# Patient Record
Sex: FEMALE | Race: WHITE | URBAN - METROPOLITAN AREA
[De-identification: names, ages, dates, MRNs, and addresses within clinical notes are randomized per-mention and may not be internally consistent; named-entity substitution may affect disease eponyms.]

---

## 2017-04-03 ENCOUNTER — TRANSFERRED RECORDS (OUTPATIENT)
Dept: HEALTH INFORMATION MANAGEMENT | Facility: CLINIC | Age: 77
End: 2017-04-03

## 2017-10-10 ENCOUNTER — TRANSFERRED RECORDS (OUTPATIENT)
Dept: HEALTH INFORMATION MANAGEMENT | Facility: CLINIC | Age: 77
End: 2017-10-10

## 2017-10-17 ENCOUNTER — TRANSFERRED RECORDS (OUTPATIENT)
Dept: HEALTH INFORMATION MANAGEMENT | Facility: CLINIC | Age: 77
End: 2017-10-17

## 2017-10-31 PROCEDURE — 00000346 ZZHCL STATISTIC REVIEW OUTSIDE SLIDES TC 88321: Performed by: OBSTETRICS & GYNECOLOGY

## 2017-11-01 LAB — COPATH REPORT: NORMAL

## 2017-11-13 ENCOUNTER — ONCOLOGY VISIT (OUTPATIENT)
Dept: ONCOLOGY | Facility: CLINIC | Age: 77
End: 2017-11-13
Attending: OBSTETRICS & GYNECOLOGY
Payer: MEDICARE

## 2017-11-13 DIAGNOSIS — Z01.818 PRE-OP EXAM: ICD-10-CM

## 2017-11-13 DIAGNOSIS — N88.9 CERVICAL LESION: ICD-10-CM

## 2017-11-13 DIAGNOSIS — R91.8 PULMONARY NODULES: ICD-10-CM

## 2017-11-13 DIAGNOSIS — C54.1 ENDOMETRIAL CANCER (H): ICD-10-CM

## 2017-11-13 DIAGNOSIS — Z01.818 PRE-OP EXAM: Primary | ICD-10-CM

## 2017-11-13 LAB
ALBUMIN SERPL-MCNC: 3.7 G/DL (ref 3.4–5)
ALP SERPL-CCNC: 52 U/L (ref 40–150)
ALT SERPL W P-5'-P-CCNC: 23 U/L (ref 0–50)
ANION GAP SERPL CALCULATED.3IONS-SCNC: 7 MMOL/L (ref 3–14)
AST SERPL W P-5'-P-CCNC: 16 U/L (ref 0–45)
BASOPHILS # BLD AUTO: 0.1 10E9/L (ref 0–0.2)
BASOPHILS NFR BLD AUTO: 0.8 %
BILIRUB SERPL-MCNC: 1.4 MG/DL (ref 0.2–1.3)
BUN SERPL-MCNC: 21 MG/DL (ref 7–30)
CALCIUM SERPL-MCNC: 9.3 MG/DL (ref 8.5–10.1)
CHLORIDE SERPL-SCNC: 106 MMOL/L (ref 94–109)
CO2 SERPL-SCNC: 27 MMOL/L (ref 20–32)
CREAT SERPL-MCNC: 0.96 MG/DL (ref 0.52–1.04)
DIFFERENTIAL METHOD BLD: NORMAL
EOSINOPHIL # BLD AUTO: 0.2 10E9/L (ref 0–0.7)
EOSINOPHIL NFR BLD AUTO: 3 %
ERYTHROCYTE [DISTWIDTH] IN BLOOD BY AUTOMATED COUNT: 13.1 % (ref 10–15)
GFR SERPL CREATININE-BSD FRML MDRD: 56 ML/MIN/1.7M2
GLUCOSE SERPL-MCNC: 108 MG/DL (ref 70–99)
HCT VFR BLD AUTO: 45.3 % (ref 35–47)
HGB BLD-MCNC: 14.7 G/DL (ref 11.7–15.7)
IMM GRANULOCYTES # BLD: 0 10E9/L (ref 0–0.4)
IMM GRANULOCYTES NFR BLD: 0.2 %
LYMPHOCYTES # BLD AUTO: 2.5 10E9/L (ref 0.8–5.3)
LYMPHOCYTES NFR BLD AUTO: 38.3 %
MCH RBC QN AUTO: 29.3 PG (ref 26.5–33)
MCHC RBC AUTO-ENTMCNC: 32.5 G/DL (ref 31.5–36.5)
MCV RBC AUTO: 90 FL (ref 78–100)
MONOCYTES # BLD AUTO: 0.5 10E9/L (ref 0–1.3)
MONOCYTES NFR BLD AUTO: 7.4 %
NEUTROPHILS # BLD AUTO: 3.3 10E9/L (ref 1.6–8.3)
NEUTROPHILS NFR BLD AUTO: 50.3 %
NRBC # BLD AUTO: 0 10*3/UL
NRBC BLD AUTO-RTO: 0 /100
PLATELET # BLD AUTO: 178 10E9/L (ref 150–450)
POTASSIUM SERPL-SCNC: 3.4 MMOL/L (ref 3.4–5.3)
PROT SERPL-MCNC: 7.8 G/DL (ref 6.8–8.8)
RBC # BLD AUTO: 5.02 10E12/L (ref 3.8–5.2)
SODIUM SERPL-SCNC: 140 MMOL/L (ref 133–144)
WBC # BLD AUTO: 6.6 10E9/L (ref 4–11)

## 2017-11-13 PROCEDURE — 36415 COLL VENOUS BLD VENIPUNCTURE: CPT | Performed by: OBSTETRICS & GYNECOLOGY

## 2017-11-13 PROCEDURE — 88305 TISSUE EXAM BY PATHOLOGIST: CPT | Performed by: OBSTETRICS & GYNECOLOGY

## 2017-11-13 PROCEDURE — 99205 OFFICE O/P NEW HI 60 MIN: CPT | Mod: ZP | Performed by: OBSTETRICS & GYNECOLOGY

## 2017-11-13 PROCEDURE — 57500 BIOPSY OF CERVIX: CPT | Mod: ZF | Performed by: OBSTETRICS & GYNECOLOGY

## 2017-11-13 PROCEDURE — 93010 ELECTROCARDIOGRAM REPORT: CPT | Performed by: INTERNAL MEDICINE

## 2017-11-13 PROCEDURE — 80053 COMPREHEN METABOLIC PANEL: CPT | Performed by: OBSTETRICS & GYNECOLOGY

## 2017-11-13 PROCEDURE — 85025 COMPLETE CBC W/AUTO DIFF WBC: CPT | Performed by: OBSTETRICS & GYNECOLOGY

## 2017-11-13 RX ORDER — PHENAZOPYRIDINE HYDROCHLORIDE 200 MG/1
200 TABLET, FILM COATED ORAL ONCE
Status: CANCELLED | OUTPATIENT
Start: 2017-11-13 | End: 2017-11-13

## 2017-11-13 NOTE — NURSING NOTE
Pre Op Nurse Teaching Template    Relevant Diagnosis: endometrial cancer    Teaching Topic: laparoscopic removal of uterus tubes ovaries cervix possible open abdomen, cancer staging, lymph node dissection    Person(s) involved in teaching :  Patient  & daughter  Motivation Level:  Asks Questions:    Yes      Eager to Learn:     Yes     Cooperative:          Yes    Receptive (willing. Able to accept information):    Yes      Patient and those who are listed above demonstrates understanding of the following:   Reason for the appointment, diagnosis and treatment plan:   Yes   Knowledge of proper use of medications and conditions for which they are ordered (with special attention to potential side effects or drug interactions): Yes   Which situations necessitate calling provider and whom to contact: Yes         Nutritional needs and diet plan:  Yes      Pain management techniques:     Yes, Pain Scale   Diet:   Yes, [facility] Diet Instructions    Teaching Concerns addressed: Yes    Infection Prevention:  Patient and those who are listed above demonstrate understanding of the following:  Pre-Op CHG Bathing Instructions: Yes  Surgical procedure site care taught:   Yes   Signs and symptoms of infection taught: Yes       Instructional Materials Used/Given:  [facility] [place] Before You Surgery Booklet  Showering or Bathing before Surgery Instructions & CHG Product  Hysterectomy Guidelines  Pain Assessment Tool   Home Care after Major Abdominal or Vaginal Surgery  Map  Accommodations Brochure  Phone numbers for [facility] and [facility]  Copy of Surgical Consent    Done Today:  Lab work, EKG, Chest Xray,   Preop Visit  not needed   Tests Ordered:  Post Op Visit Scheduled:12/18  Comments:    Surgery date/time:12/1    Consent sent to file in medical records  .

## 2017-11-13 NOTE — MR AVS SNAPSHOT
"              After Visit Summary   11/13/2017    Shani Tinoco    MRN: [id number]           Patient Information     Date Of Birth          1940        Visit Information        Provider Department      11/13/2017 12:00 PM Kt Degroot MD [facility]        Today's Diagnoses     Pre-op exam    -  1    Endometrial cancer (H)        Cervical lesion           Follow-ups after your visit        Your next 10 appointments already scheduled     Dec 18, 2017  3:00 PM CST   (Arrive by 2:45 PM)   Return Visit with Kt Degroot MD   [facility] ([facility])    [address] [place] [id number] [phone]              Who to contact     If you have questions or need follow up information about today's clinic visit or your schedule please contact [facility] directly at [phone].  Normal or non-critical lab and imaging results will be communicated to you by MyChart, letter or phone within 4 business days after the clinic has received the results. If you do not hear from us within 7 days, please contact the clinic through MyChart or phone. If you have a critical or abnormal lab result, we will notify you by phone as soon as possible.  Submit refill requests through [employer] or call your pharmacy and they will forward the refill request to us. Please allow 3 business days for your refill to be completed.          Additional Information About Your Visit        MyChart Information     [employer] lets you send messages to your doctor, view your test results, renew your prescriptions, schedule appointments and more. To sign up, go to [email or web address] . Click on \"Log in\" on the left side of the screen, which will take you to the Welcome page. Then click on \"Sign up Now\" on the right side of the page.     You will be asked to enter the access code listed below, as well as some personal " information. Please follow the directions to create your username and password.     Your access code is: [email or web address]  Expires: 2018  7:45 PM     Your access code will  in 90 days. If you need help or a new code, please call your [facility] clinic or [phone].        Care EveryWhere ID     This is your Care EveryWhere ID. This could be used by other organizations to access your [facility] medical records  [id number]         Blood Pressure from Last 3 Encounters:   No data found for BP    Weight from Last 3 Encounters:   No data found for Wt              We Performed the Following     Biopsy of Cervix     EKG 12-Lead Complete w/read - Clinics     Kat-Operative Worksheet - Laparoscopic Total Hysterectomy, bilateral, Salpingo-Oophorectomy, possible open, possible lymph node dissection     Surgical pathology exam        Primary Care Provider Office Phone # Fax #    Daniella Rodrigues [phone]       [place] [address]        Equal Access to Services     DAMI LOVELL AH: Hadii aad ku hadasho Soomaali, waaxda luqadaha, qaybta kaalmada adeegyada, waxay idiin hayaan narcisa pugh . So [facility] [phone].    ATENCIÓN: Si habla español, tiene a brumfield disposición servicios gratuitos de asistencia lingüística. [facility] [phone].    We comply with applicable federal civil rights laws and [place] laws. We do not discriminate on the basis of race, color, national origin, age, disability, sex, sexual orientation, or gender identity.            Thank you!     Thank you for choosing [facility]  for your care. Our goal is always to provide you with excellent care. Hearing back from our patients is one way we can continue to improve our services. Please take a few minutes to complete the written survey that you may receive in the mail after your visit with us. Thank you!             Your Updated Medication List - Protect others around you:  Learn how to safely use, store and throw away your medicines at www.disposemymeds.org.      Notice  As of 11/13/2017  5:17 PM    You have not been prescribed any medications.

## 2017-11-13 NOTE — PROGRESS NOTES
Consult Notes on Referred Patient    Date: 2017       Dr. Richardson [facility]  [address] [place] [id number]       RE: Shani Campa Earnest  : 1940  ANYA: 2017    Dear Dr. Richardson [facility]:    I had the pleasure of seeing your patient Shani Tinoco here at the Gynecologic Cancer Clinic at the [facility] on 2017.  As you know, she is a very pleasant 77 year old woman with a recent diagnosis of endometrioid adenocarcinoma of the uterus.  Given these findings, she was subsequently sent to the Gynecologic Cancer Clinic for a new patient consultation.     Today, she reports that she is feeling well. She notes improvement in her vaginal bleeding since starting on oral progesterone as prescribed by her PCP. She denies any other symptoms, including fevers/chills, abnormal weight gain or weight los, nausea/vomiting, chest pain, shortness of breath, dysuria, change in bowel habit, or other systemic symptoms.    Cancer History:  2014: First presentation for postmenopausal bleeding.  - Pelvic US Impression: Enlarged uterus with thickened endometrium, EMS 5.57 cm. No evidence of focal mass or the like within the endometrial cavity. Right ovary not visualized. Left without abnormality.  - EMBx notable for simple hyperplasia.   - Underwent progestin therapy x 3 months. Repeat EMBx without hyperplasia.    10/10/17: Presentation to PCP for recurrent episode of postmenopausal bleeding. Subsequent endometrial biopsy, with results of:  - Cervical biopsy: Squamous and columnar mucosa with mild chronic inflammation. No squamous intraepithelial lesion identified  - Endometrial biopsy: Endometrioid adenocarcinoma, FIGO Grade 2.    10/31/17: [facility] Pathology Consult. CASE FROM [place], [place] ([id number], OBTAINED 10/10/17):   A. CERVIX, BIOPSY:   - Squamous and columnar mucosa with mild chronic inflammation   - No squamous intraepithelial lesion identified     B.  ENDOMETRIUM, BIOPSY:   - Endometrioid adenocarcinoma, FIGO grade 2      Review of Systems:  Systemic           no weight changes; no fever; no chills; no night sweats; no appetite changes  Skin           no rashes, or lesions  Eye           no irritation; no changes in vision  Lisa-Laryngeal           no dysphagia; no hoarseness   Pulmonary    no cough; no shortness of breath  Cardiovascular    no chest pain; no palpitations  Gastrointestinal    no diarrhea; no constipation; no abdominal pain; no changes in bowel  habits; no blood in stool  Genitourinary   no urinary frequency; no urinary urgency; no dysuria; no pain; no abnormal vaginal discharge; no abnormal vaginal bleeding  Breast    no breast discharge; no breast changes; no breast pain  Musculoskeletal    no myalgias; no arthralgias; no back pain  Psychiatric           no depressed mood; no anxiety    Hematologic           no tender lymph nodes; no noticeable swellings or lumps   Endocrine    no hot flashes; no heat/cold intolerance         Neurological   no tremor; no numbness and tingling; no headaches; no difficulty  sleeping      Past Medical History:  Past Medical History:   Diagnosis Date     Basal cell carcinoma      Cataract      Cholelithiasis      Essential hypertension      Hyperlipidemia      Nonexudative senile macular degeneration of retina      Osteopenia        Past Surgical History:  Past Surgical History:   Procedure Laterality Date     HC EXCISION BREAST LESION, OPEN >=1      sclerosing papilloma, excised right breast     RELEASE CARPAL TUNNEL       Unlisted proc Lungs & Pleura      Excision of hamartoma of lung     urethral sling         Health Maintenance:  Health Maintenance Due   Topic Date Due     TETANUS IMMUNIZATION (SYSTEM ASSIGNED)  08/12/1956     LIPID SCREEN Q5 YR FEMALE (SYSTEM ASSIGNED)  08/12/1983     ADVANCE DIRECTIVE PLANNING Q5 YRS  08/12/1993     FALL RISK ASSESSMENT  08/12/2003     PNEUMOCOCCAL (1 of 2 - PCV13) 08/12/2003      INFLUENZA VACCINE (SYSTEM ASSIGNED)  2017       Pap Smear:  Can not recall her last Pap Smear, but denies any history of abnormal Pap smears.    Last Mammogram: 12/21/15              Result: normal      She has not had a history of abnormal mammograms.    Last Colonoscopy: Can not recall her last Pap Smear, but denies any history                    Last DEXA Scan: 12/21/15              Result: normal    Last Diabetes Screening; 17    Last Thyroid Screening:      Not identified    Last Cholest Screenin/27/17      Current Medications:   Megestrol - 40 mg once daily  Hyzaar- 100-25 mg tablet daily  Norvasc- 5 mg tablet daily  Crestor- 40 mg tablet daily  Low dose aspirin - 81 mg daily      Allergies:   Allergies   Allergen Reactions     Norco [Hydrocodone-Acetaminophen] Nausea and Vomiting         Social History:   Social History   Substance Use Topics     Smoking status: Not on file     Smokeless tobacco: Not on file     Alcohol use Not on file       History   Drug Use Not on file       Family History:   The patient's family history is notable for:  Family History   Problem Relation Age of Onset     Breast Cancer Mother      Breast Cancer Sister      Breast Cancer Maternal Aunt      Breast Cancer Maternal Aunt      Breast Cancer Maternal Aunt      Breast Cancer Daughter      Cervical Cancer Daughter      Denies family history of colon, uterine, or ovarian cancer    Physical Exam:   There were no vitals taken for this visit.  There is no height or weight on file to calculate BMI.    General Appearance: healthy and alert, no distress     HEENT:  no thyromegaly, no palpable nodules or masses        Cardiovascular: regular rate and rhythm, no gallops, rubs or murmurs     Respiratory: lungs clear, no rales, rhonchi or wheezes, normal diaphragmatic excursion    Musculoskeletal: extremities non tender and without edema    Skin: no lesions or rashes     Neurological: normal gait, no gross  defects     Psychiatric: appropriate mood and affect                               Hematological: normal cervical, supraclavicular and inguinal lymph nodes     Gastrointestinal:       abdomen soft, non-tender, non-distended, no organomegaly or masses    Genitourinary: External genitalia and urethral meatus appears normal.  Vagina is smooth without nodularity or masses.  Cervix is multiparous with presence of what appears to be an endocervical polyp at the 9 o'clock position.  Bimanual exam reveal no masses, nodularity or fullness.  Recto-vaginal exam confirms these findings.    Procedure:  Cervix was cleansed with betadine solution after verification that she did not have an allergy. Biopsy was taken with Tischler forceps and placed in specimen cup and sent to pathology. Silver nitrate used to chemically cauterize the area. Hemostasis noted at the end of procedure.      Assessment:    Shani Tinoco is a 77 year old woman with a new diagnosis of endometrioid adenocarcinoma of the uterus.     A total of 30 minutes was spent with the patient, 30 minutes of which were spent in counseling the patient and/or treatment planning.    Plan:     1.)    Uterine adenocarcinoma. Discussed pathology obtained on recent endometrial biopsies. Recommendations to proceed with surgical intervention. Discussed TLH, BSO, with possible need for XL, sentinel lymph node biopsy, PPALND, and cancer staging. Risks, including bleeding, infection, and injury to surrounding organs were discussed with patient. She is amenable to proceed. Also discussed that final pathology would dictate ultimate treatment plan. Discussed both ends of treatment spectrum, including interval screening to chemotherapy vs. radiation.     2.) Genetic risk factors were assessed and the patient does meet the qualifications for a referral.    3.) Labs and/or tests ordered include:  EKG, CXR, CBC, and CMP.     4.) Health maintenance issues addressed today include:  recommendations for follow up colonoscopy and mammogram.    5.) Pre-op teaching was completed today.  Risks of surgery were discussed to include: bleeding, transfusion, infection, unintentional injury to surrounding organs/structures.      Thank you for allowing us to participate in the care of your patient.         Sincerely,    Kt Degroot    I have examined this patient with our resident who acted as as scribe and agree with the above findings and plan.    Kt Degroot          Addendum:  This patient was evaluated by CXR and noted to have a significant incidental lesion.  Will plan for pulmonary nodule clinic appointment - I attempted to call patient without success (no answering machine at only number.    CC  Patient Care Team

## 2017-11-14 LAB — INTERPRETATION ECG - MUSE: NORMAL

## 2017-11-15 LAB — COPATH REPORT: NORMAL

## 2017-11-21 DIAGNOSIS — R91.1 LUNG NODULE: Primary | ICD-10-CM

## 2017-11-28 ENCOUNTER — OFFICE VISIT (OUTPATIENT)
Dept: PULMONOLOGY | Facility: CLINIC | Age: 77
End: 2017-11-28
Attending: INTERNAL MEDICINE
Payer: MEDICARE

## 2017-11-28 ENCOUNTER — HOSPITAL ENCOUNTER (OUTPATIENT)
Dept: CT IMAGING | Facility: CLINIC | Age: 77
Discharge: HOME | End: 2017-11-28
Attending: CLINICAL NURSE SPECIALIST | Admitting: CLINICAL NURSE SPECIALIST
Payer: MEDICARE

## 2017-11-28 VITALS
OXYGEN SATURATION: 94 % | HEART RATE: 79 BPM | SYSTOLIC BLOOD PRESSURE: 155 MMHG | RESPIRATION RATE: 17 BRPM | TEMPERATURE: 98 F | BODY MASS INDEX: 34.06 KG/M2 | WEIGHT: 180.4 LBS | DIASTOLIC BLOOD PRESSURE: 90 MMHG | HEIGHT: 61 IN

## 2017-11-28 DIAGNOSIS — R91.8 PULMONARY NODULES: ICD-10-CM

## 2017-11-28 DIAGNOSIS — K76.9 LIVER LESION: ICD-10-CM

## 2017-11-28 DIAGNOSIS — R91.1 LUNG NODULE: ICD-10-CM

## 2017-11-28 DIAGNOSIS — C54.1 ENDOMETRIAL CANCER (H): Primary | ICD-10-CM

## 2017-11-28 LAB — RADIOLOGIST FLAGS: NORMAL

## 2017-11-28 PROCEDURE — 71250 CT THORAX DX C-: CPT

## 2017-11-28 PROCEDURE — 99212 OFFICE O/P EST SF 10 MIN: CPT | Mod: ZF

## 2017-11-28 RX ORDER — LOSARTAN POTASSIUM AND HYDROCHLOROTHIAZIDE 25; 100 MG/1; MG/1
1 TABLET ORAL
COMMUNITY
Start: 2016-10-26 | End: 2017-11-30

## 2017-11-28 RX ORDER — ROSUVASTATIN CALCIUM 40 MG/1
40 TABLET, COATED ORAL DAILY
COMMUNITY
Start: 2016-10-27

## 2017-11-28 RX ORDER — AMLODIPINE BESYLATE 5 MG/1
5 TABLET ORAL DAILY
Status: ON HOLD | COMMUNITY
Start: 2016-06-17 | End: 2017-12-01

## 2017-11-28 RX ORDER — MEGESTROL ACETATE 40 MG/1
40 TABLET ORAL
COMMUNITY
Start: 2017-10-10 | End: 2018-01-08

## 2017-11-28 RX ORDER — LOSARTAN POTASSIUM AND HYDROCHLOROTHIAZIDE 25; 100 MG/1; MG/1
1 TABLET ORAL
COMMUNITY
Start: 2016-10-27

## 2017-11-28 RX ORDER — ASPIRIN 81 MG/1
81 TABLET ORAL DAILY
COMMUNITY

## 2017-11-28 RX ORDER — OMEGA-3-ACID ETHYL ESTERS 900 MG/1
1000 CAPSULE, LIQUID FILLED ORAL DAILY
COMMUNITY

## 2017-11-28 ASSESSMENT — PAIN SCALES - GENERAL: PAINLEVEL: NO PAIN (0)

## 2017-11-28 NOTE — PROGRESS NOTES
Pulmonary Clinic     We have been asked by Dr. Degroot to evaluate this patient in regards to   Chief Complaint   Patient presents with     Oncology Clinic Visit     new patient visit for consultation related to lung nodules          HPI:     This is a 77-year-old female with a recent diagnosis of endometrial adenocarcinoma..  She also has a history of basal cell carcinoma of the skin.  She has a family history of breast cancer and cervical cancer.  No family history of lung cancer.  She had a chest x-ray dated 11/13/2017 was demonstrated a right upper lobe nodular opacity.  A CT of the chest was recommended an demonstrates a left upper lobe nodule. She has some dyspnea with exertion and fatigue. She has some remote tobacco use hx. No family hx of lung cancer. No hemoptysis. No significant weight loss.        Review of Systems:   10 point ROS performed with pertinent +/- noted in the HPI.  The remainder of the ROS was otherwise negative.        Pertinent Medications     Current Outpatient Prescriptions   Medication     cholecalciferol (VITAMIN D3) 1000 UNIT tablet     aspirin EC 81 MG EC tablet     amLODIPine (NORVASC) 5 MG tablet     losartan-hydrochlorothiazide (HYZAAR) 100-25 MG per tablet     losartan-hydrochlorothiazide (HYZAAR) 100-25 MG per tablet     rosuvastatin (CRESTOR) 40 MG tablet     megestrol (MEGACE) 40 MG tablet     Omega-3-acid Ethyl Esters (FISH OIL OMEGA-3 FATTY ACID) 320MG/ML oral suspension     No current facility-administered medications for this visit.         Allergies:      Allergies   Allergen Reactions     Norco [Hydrocodone-Acetaminophen] Nausea and Vomiting          Past Medical Hx:     Endometrial cancer       Family Hx:     Family History   Problem Relation Age of Onset     Breast Cancer Mother      Breast Cancer Sister      Breast Cancer Maternal Aunt      Breast Cancer Maternal Aunt      Breast Cancer Maternal Aunt      Breast Cancer Daughter      Cervical Cancer Daughter          "Social Hx:   The patient has a 20 pk yr tobacco hx.  She has no active use.  Alcohol use is rare alcoholic drinks per week.  She denies use of recreational drugs.      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   5 children.     The patient is .   recently about 6 months ago from 2017.       Objective   Vitals:  /90  Pulse 79  Temp 98  F (36.7  C) (Oral)  Resp 17  Ht 1.537 m (5' 0.5\")  Wt 81.8 kg (180 lb 6.4 oz)  SpO2 94%  BMI 34.65 kg/m2    General:  Adult female;appears stated age; no acute distress; the patient is a good historian  HEENT:  NCAT; EOMI; No icterus; no injection; MMM  Neck: Supple, full range of motion, no lymphadenopathy  Pulm: CTAB, normal to percussion  CV: RRR, no murmurs, rubs or gallops        Labs / Imaging/Studies     CBC RESULTS:   Recent Labs   Lab Test  17   1332   WBC  6.6   RBC  5.02   HGB  14.7   HCT  45.3   MCV  90   MCH  29.3   MCHC  32.5   RDW  13.1   PLT  178     Lab Results   Component Value Date     2017    Lab Results   Component Value Date    CHLORIDE 106 2017    Lab Results   Component Value Date    BUN 21 2017      Lab Results   Component Value Date    POTASSIUM 3.4 2017    Lab Results   Component Value Date    CO2 27 2017    Lab Results   Component Value Date    CR 0.96 2017        Imaging:     CXR:                     CT chest: 1. Lobular pulmonary nodule measuring up to 2.6 cm in the apical  segment of the right upper lobe. Heterogeneous appearance with  questionable internal fat. Findings may indicate a pulmonary  hamartoma, although malignancy is still high on the differential, and  a biopsy is still warranted. No mediastinal lymphadenopathy.  2. Pulmonary nodule measuring 7 mm in the superior segment of the left  lower lobe, likely unrelated to the primary lesion given no  mediastinal lymphadenopathy.  3. Dilated pulmonary artery, which may indicate pulmonary  hypertension.         " Assessment and Plan:   Assessment: This is an elderly female who was recently diagnosed with endometrial adenocarcinoma as well as has a history of basal cell carcinoma of the skin who presents with a right upper lobe lung nodule which measures 2.6 cm in largest diameter.  Though there is a small area of possible fat within the nodule which argue more toward her hamartoma given the size as well as history of malignancies further evaluation is warranted with a biopsy.  We discussed at the multidisciplinary pulmonary nodule conference different options in regards to biopsy but a percutaneous approach would be limited given the central location of the nodule.  The best option and biopsy would be a navigational bronchoscopy which we will schedule in the operating room.  The patient will like to recover from her surgery before moving forward with a lung biopsy.    A pet scan may help to find another area to biopsy which will be higher yield.     1. Pulmonary nodules  See above    2. Endometrial cancer (H)  See above  - PET Oncology (Eyes to Thighs); Future    I spent 45 minutes with direct face to face interaction with this patient and provided at least 50% of this time counseling and coordinating care for lung nodule concerning for malignancy as noted above in the assessment and plan.        Dalton Mason MD  Pulmonary and Critical Care  [facility]  Pager:  [phone]

## 2017-11-28 NOTE — Clinical Note
Pt ready to leave. We will work on scheduling PET and follow up after nodule conference discussion. AVS printed.

## 2017-11-28 NOTE — PATIENT INSTRUCTIONS
1.  Have PET scan  2.  Will discuss biopsy options at the Pulmonary Nodule Conference on Friday. (December) We will call you or granddaughter on Friday or the following Monday to discuss the options. We may have to wait until PET scan is performed.   3.  Jennifer will contact you in regards to the PET scan [phone].

## 2017-11-28 NOTE — NURSING NOTE
"Oncology Rooming Note    November 28, 2017 2:59 PM   Shani Tinoco is a 77 year old female who presents for:    Chief Complaint   Patient presents with     Oncology Clinic Visit     new patient visit for consultation related to lung nodules      Initial Vitals: /90  Pulse 79  Temp 98  F (36.7  C) (Oral)  Resp 17  Ht 1.537 m (5' 0.5\")  Wt 81.8 kg (180 lb 6.4 oz)  SpO2 94%  BMI 34.65 kg/m2 Estimated body mass index is 34.65 kg/(m^2) as calculated from the following:    Height as of this encounter: 1.537 m (5' 0.5\").    Weight as of this encounter: 81.8 kg (180 lb 6.4 oz). Body surface area is 1.87 meters squared.  No Pain (0) Comment: Data Unavailable   No LMP recorded.  Allergies reviewed: Yes  Medications reviewed: Yes    Medications: Medication refills not needed today.  Pharmacy name entered into [place] Communication: [id number] - [place], [place] - [address] [place] [address]    Clinical concerns: no concerns dr. mckinley was notified.    6 minutes for nursing intake (face to face time)     Chacho Rodriguez CMA              "

## 2017-11-28 NOTE — LETTER
11/28/2017       RE: Shani Tinoco  [address] [facility] [place] [address]     Dear Colleague,    Thank you for referring your patient, Shani Tinoco, to the [facility] at [facility]. Please see a copy of my visit note below.    Pulmonary Clinic     We have been asked by Dr. Degroot to evaluate this patient in regards to   Chief Complaint   Patient presents with     Oncology Clinic Visit     new patient visit for consultation related to lung nodules          HPI:     This is a 77-year-old female with a recent diagnosis of endometrial adenocarcinoma..  She also has a history of basal cell carcinoma of the skin.  She has a family history of breast cancer and cervical cancer.  No family history of lung cancer.  She had a chest x-ray dated 11/13/2017 was demonstrated a right upper lobe nodular opacity.  A CT of the chest was recommended an demonstrates a left upper lobe nodule. She has some dyspnea with exertion and fatigue. She has some remote tobacco use hx. No family hx of lung cancer. No hemoptysis. No significant weight loss.        Review of Systems:   10 point ROS performed with pertinent +/- noted in the HPI.  The remainder of the ROS was otherwise negative.        Pertinent Medications     Current Outpatient Prescriptions   Medication     cholecalciferol (VITAMIN D3) 1000 UNIT tablet     aspirin EC 81 MG EC tablet     amLODIPine (NORVASC) 5 MG tablet     losartan-hydrochlorothiazide (HYZAAR) 100-25 MG per tablet     losartan-hydrochlorothiazide (HYZAAR) 100-25 MG per tablet     rosuvastatin (CRESTOR) 40 MG tablet     megestrol (MEGACE) 40 MG tablet     Omega-3-acid Ethyl Esters (FISH OIL OMEGA-3 FATTY ACID) 320MG/ML oral suspension     No current facility-administered medications for this visit.         Allergies:      Allergies   Allergen Reactions     Norco [Hydrocodone-Acetaminophen] Nausea and Vomiting          Past Medical Hx:     Endometrial  "cancer       Family Hx:     Family History   Problem Relation Age of Onset     Breast Cancer Mother      Breast Cancer Sister      Breast Cancer Maternal Aunt      Breast Cancer Maternal Aunt      Breast Cancer Maternal Aunt      Breast Cancer Daughter      Cervical Cancer Daughter         Social Hx:   The patient has a 20 pk yr tobacco hx.  She has no active use.  Alcohol use is rare alcoholic drinks per week.  She denies use of recreational drugs.      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   5 children.     The patient is .   recently about 6 months ago from 2017.       Objective   Vitals:  /90  Pulse 79  Temp 98  F (36.7  C) (Oral)  Resp 17  Ht 1.537 m (5' 0.5\")  Wt 81.8 kg (180 lb 6.4 oz)  SpO2 94%  BMI 34.65 kg/m2    General:  Adult female;appears stated age; no acute distress; the patient is a good historian  HEENT:  NCAT; EOMI; No icterus; no injection; MMM  Neck: Supple, full range of motion, no lymphadenopathy  Pulm: CTAB, normal to percussion  CV: RRR, no murmurs, rubs or gallops        Labs / Imaging/Studies     CBC RESULTS:   Recent Labs   Lab Test  17   1332   WBC  6.6   RBC  5.02   HGB  14.7   HCT  45.3   MCV  90   MCH  29.3   MCHC  32.5   RDW  13.1   PLT  178     Lab Results   Component Value Date     2017    Lab Results   Component Value Date    CHLORIDE 106 2017    Lab Results   Component Value Date    BUN 21 2017      Lab Results   Component Value Date    POTASSIUM 3.4 2017    Lab Results   Component Value Date    CO2 27 2017    Lab Results   Component Value Date    CR 0.96 2017        Imaging:     CXR:                     CT chest: 1. Lobular pulmonary nodule measuring up to 2.6 cm in the apical  segment of the right upper lobe. Heterogeneous appearance with  questionable internal fat. Findings may indicate a pulmonary  hamartoma, although malignancy is still high on the differential, and  a biopsy is still " warranted. No mediastinal lymphadenopathy.  2. Pulmonary nodule measuring 7 mm in the superior segment of the left  lower lobe, likely unrelated to the primary lesion given no  mediastinal lymphadenopathy.  3. Dilated pulmonary artery, which may indicate pulmonary  hypertension.         Assessment and Plan:   Assessment: This is an elderly female who was recently diagnosed with endometrial adenocarcinoma as well as has a history of basal cell carcinoma of the skin who presents with a right upper lobe lung nodule which measures 2.6 cm in largest diameter.  Though there is a small area of possible fat within the nodule which argue more toward her hamartoma given the size as well as history of malignancies further evaluation is warranted with a biopsy.  We discussed at the multidisciplinary pulmonary nodule conference different options in regards to biopsy but a percutaneous approach would be limited given the central location of the nodule.  The best option and biopsy would be a navigational bronchoscopy which we will schedule in the operating room.  The patient will like to recover from her surgery before moving forward with a lung biopsy.    A pet scan may help to find another area to biopsy which will be higher yield.     1. Pulmonary nodules  See above    2. Endometrial cancer (H)  See above  - PET Oncology (Eyes to Thighs); Future    I spent 45 minutes with direct face to face interaction with this patient and provided at least 50% of this time counseling and coordinating care for lung nodule concerning for malignancy as noted above in the assessment and plan.        Dalton Mason MD  Pulmonary and Critical Care  [facility]  Pager:  [phone]

## 2017-11-28 NOTE — MR AVS SNAPSHOT
After Visit Summary   11/28/2017    Shani Tinoco    MRN: [id number]           Patient Information     Date Of Birth          1940        Visit Information        Provider Department      11/28/2017 2:30 PM Dalton Mason MD [facility]        Today's Diagnoses     Endometrial cancer (H)    -  1    Pulmonary nodules        Liver lesion          Care Instructions    1.  Have PET scan  2.  Will discuss biopsy options at the Pulmonary Nodule Conference on Friday. (December) We will call you or granddaughter on Friday or the following Monday to discuss the options. We may have to wait until PET scan is performed.   3.  Jennifer will contact you in regards to the PET scan [phone].           Follow-ups after your visit        Your next 10 appointments already scheduled     Dec 01, 2017   Procedure with Kt Degroot MD   [facility], [place], Same Day Surgery (--)    [address] [facility] [id number] [phone]            Dec 18, 2017  3:00 PM CST   (Arrive by 2:45 PM)   Return Visit with Kt Degroot MD   [facility])    [address] [address] [phone]              Future tests that were ordered for you today     Open Future Orders        Priority Expected Expires Ordered    PET Oncology (Eyes to Thighs) Routine  11/28/2018 11/28/2017            Who to contact     If you have questions or need follow up information about today's clinic visit or your schedule please contact [facility] directly at [phone].  Normal or non-critical lab and imaging results will be communicated to you by MyChart, letter or phone within 4 business days after the clinic has received the results. If you do not hear from us within 7 days, please contact the clinic through MyChart or phone. If you have a critical or abnormal lab result, we will notify you  "by phone as soon as possible.  Submit refill requests through "[employer] or call your pharmacy and they will forward the refill request to us. Please allow 3 business days for your refill to be completed.          Additional Information About Your Visit        [employer] Information     "[employer] lets you send messages to your doctor, view your test results, renew your prescriptions, schedule appointments and more. To sign up, go to [email or web address].[email or web address]/"[employer] . Click on \"Log in\" on the left side of the screen, which will take you to the Welcome page. Then click on \"Sign up Now\" on the right side of the page.     You will be asked to enter the access code listed below, as well as some personal information. Please follow the directions to create your username and password.     Your access code is: [email or web address]  Expires: 2018  7:45 PM     Your access code will  in 90 days. If you need help or a new code, please call your [place] clinic or [phone].        Care EveryWhere ID     This is your Care EveryWhere ID. This could be used by other organizations to access your [place] medical records  [id number]        Your Vitals Were     Pulse Temperature Respirations Height Pulse Oximetry BMI (Body Mass Index)    79 98  F (36.7  C) (Oral) 17 1.537 m (5' 0.5\") 94% 34.65 kg/m2       Blood Pressure from Last 3 Encounters:   17 155/90    Weight from Last 3 Encounters:   17 81.8 kg (180 lb 6.4 oz)               Primary Care Provider Office Phone # Fax #    Daniella Rodrigues [phone]       [place] [id number]        Equal Access to Services     [facility]: Hadii maurilio Finney, waaxda luqadaha, qaybta kaalmada katherin, abril ghosh. So [facility] [phone].    ATENCIÓN: Si habla español, tiene a brumfield disposición servicios gratuitos de asistencia lingüística. Llame al [phone].    We comply with applicable federal " civil rights laws and [place] laws. We do not discriminate on the basis of race, color, national origin, age, disability, sex, sexual orientation, or gender identity.            Thank you!     Thank you for choosing [facility]  for your care. Our goal is always to provide you with excellent care. Hearing back from our patients is one way we can continue to improve our services. Please take a few minutes to complete the written survey that you may receive in the mail after your visit with us. Thank you!             Your Updated Medication List - Protect others around you: Learn how to safely use, store and throw away your medicines at www.disposemymeds.org.          This list is accurate as of: 11/28/17  3:34 PM.  Always use your most recent med list.                   Brand Name Dispense Instructions for use Diagnosis    amLODIPine 5 MG tablet    NORVASC     Take 5 mg by mouth        aspirin EC 81 MG EC tablet      Take 81 mg by mouth        cholecalciferol 1000 UNIT tablet    vitamin D3     Take 1,000 Units by mouth        fish oil omega-3 fatty acid 320MG/ML oral suspension      Take 1,000 mg by mouth        * losartan-hydrochlorothiazide 100-25 MG per tablet    HYZAAR     Take 1 tablet by mouth        * losartan-hydrochlorothiazide 100-25 MG per tablet    HYZAAR     Take 1 tablet by mouth        megestrol 40 MG tablet    MEGACE     Take 40 mg by mouth        rosuvastatin 40 MG tablet    CRESTOR     Take 40 mg by mouth        * Notice:  This list has 2 medication(s) that are the same as other medications prescribed for you. Read the directions carefully, and ask your doctor or other care provider to review them with you.

## 2017-11-30 ENCOUNTER — ANESTHESIA EVENT (OUTPATIENT)
Dept: SURGERY | Facility: CLINIC | Age: 77
End: 2017-11-30
Payer: MEDICARE

## 2017-12-01 ENCOUNTER — ANESTHESIA (OUTPATIENT)
Dept: SURGERY | Facility: CLINIC | Age: 77
End: 2017-12-01
Payer: MEDICARE

## 2017-12-01 ENCOUNTER — HOSPITAL ENCOUNTER (OUTPATIENT)
Facility: CLINIC | Age: 77
Setting detail: OBSERVATION
Discharge: HOME | End: 2017-12-02
Attending: OBSTETRICS & GYNECOLOGY | Admitting: OBSTETRICS & GYNECOLOGY
Payer: MEDICARE

## 2017-12-01 DIAGNOSIS — Z90.710 S/P LAPAROSCOPIC HYSTERECTOMY: Primary | ICD-10-CM

## 2017-12-01 LAB
ABO + RH BLD: NORMAL
ABO + RH BLD: NORMAL
BLD GP AB SCN SERPL QL: NORMAL
BLOOD BANK CMNT PATIENT-IMP: NORMAL
CREAT SERPL-MCNC: 0.88 MG/DL (ref 0.52–1.04)
GFR SERPL CREATININE-BSD FRML MDRD: 62 ML/MIN/1.7M2
GLUCOSE BLDC GLUCOMTR-MCNC: 121 MG/DL (ref 70–99)
POTASSIUM SERPL-SCNC: 3.4 MMOL/L (ref 3.4–5.3)
SPECIMEN EXP DATE BLD: NORMAL

## 2017-12-01 PROCEDURE — A9270 NON-COVERED ITEM OR SERVICE: HCPCS | Mod: GY | Performed by: OBSTETRICS & GYNECOLOGY

## 2017-12-01 PROCEDURE — 25000128 H RX IP 250 OP 636: Performed by: NURSE ANESTHETIST, CERTIFIED REGISTERED

## 2017-12-01 PROCEDURE — 88332 PATH CONSLTJ SURG EA ADD BLK: CPT | Performed by: OBSTETRICS & GYNECOLOGY

## 2017-12-01 PROCEDURE — 86900 BLOOD TYPING SEROLOGIC ABO: CPT | Performed by: ANESTHESIOLOGY

## 2017-12-01 PROCEDURE — 00000155 ZZHCL STATISTIC H-CELL BLOCK W/STAIN: Performed by: OBSTETRICS & GYNECOLOGY

## 2017-12-01 PROCEDURE — 86850 RBC ANTIBODY SCREEN: CPT | Performed by: ANESTHESIOLOGY

## 2017-12-01 PROCEDURE — 25000132 ZZH RX MED GY IP 250 OP 250 PS 637: Mod: GY | Performed by: OBSTETRICS & GYNECOLOGY

## 2017-12-01 PROCEDURE — 00000146 ZZHCL STATISTIC GLUCOSE BY METER IP

## 2017-12-01 PROCEDURE — 37000009 ZZH ANESTHESIA TECHNICAL FEE, EACH ADDTL 15 MIN: Performed by: OBSTETRICS & GYNECOLOGY

## 2017-12-01 PROCEDURE — 36000062 ZZH SURGERY LEVEL 4 1ST 30 MIN - UMMC: Performed by: OBSTETRICS & GYNECOLOGY

## 2017-12-01 PROCEDURE — 88112 CYTOPATH CELL ENHANCE TECH: CPT | Performed by: OBSTETRICS & GYNECOLOGY

## 2017-12-01 PROCEDURE — 71000015 ZZH RECOVERY PHASE 1 LEVEL 2 EA ADDTL HR: Performed by: OBSTETRICS & GYNECOLOGY

## 2017-12-01 PROCEDURE — G0378 HOSPITAL OBSERVATION PER HR: HCPCS

## 2017-12-01 PROCEDURE — 88331 PATH CONSLTJ SURG 1 BLK 1SPC: CPT | Performed by: OBSTETRICS & GYNECOLOGY

## 2017-12-01 PROCEDURE — 25000128 H RX IP 250 OP 636: Performed by: ANESTHESIOLOGY

## 2017-12-01 PROCEDURE — 88305 TISSUE EXAM BY PATHOLOGIST: CPT | Performed by: OBSTETRICS & GYNECOLOGY

## 2017-12-01 PROCEDURE — 25000128 H RX IP 250 OP 636: Performed by: STUDENT IN AN ORGANIZED HEALTH CARE EDUCATION/TRAINING PROGRAM

## 2017-12-01 PROCEDURE — 88309 TISSUE EXAM BY PATHOLOGIST: CPT | Performed by: OBSTETRICS & GYNECOLOGY

## 2017-12-01 PROCEDURE — 40000171 ZZH STATISTIC PRE-PROCEDURE ASSESSMENT III: Performed by: OBSTETRICS & GYNECOLOGY

## 2017-12-01 PROCEDURE — 71000014 ZZH RECOVERY PHASE 1 LEVEL 2 FIRST HR: Performed by: OBSTETRICS & GYNECOLOGY

## 2017-12-01 PROCEDURE — C9290 INJ, BUPIVACAINE LIPOSOME: HCPCS | Performed by: STUDENT IN AN ORGANIZED HEALTH CARE EDUCATION/TRAINING PROGRAM

## 2017-12-01 PROCEDURE — 25000128 H RX IP 250 OP 636: Performed by: OBSTETRICS & GYNECOLOGY

## 2017-12-01 PROCEDURE — 27210995 ZZH RX 272: Performed by: OBSTETRICS & GYNECOLOGY

## 2017-12-01 PROCEDURE — A9270 NON-COVERED ITEM OR SERVICE: HCPCS | Mod: GY | Performed by: STUDENT IN AN ORGANIZED HEALTH CARE EDUCATION/TRAINING PROGRAM

## 2017-12-01 PROCEDURE — C9399 UNCLASSIFIED DRUGS OR BIOLOG: HCPCS | Performed by: NURSE ANESTHETIST, CERTIFIED REGISTERED

## 2017-12-01 PROCEDURE — S0020 INJECTION, BUPIVICAINE HYDRO: HCPCS | Performed by: STUDENT IN AN ORGANIZED HEALTH CARE EDUCATION/TRAINING PROGRAM

## 2017-12-01 PROCEDURE — 37000008 ZZH ANESTHESIA TECHNICAL FEE, 1ST 30 MIN: Performed by: OBSTETRICS & GYNECOLOGY

## 2017-12-01 PROCEDURE — 82565 ASSAY OF CREATININE: CPT | Performed by: ANESTHESIOLOGY

## 2017-12-01 PROCEDURE — 84132 ASSAY OF SERUM POTASSIUM: CPT | Performed by: ANESTHESIOLOGY

## 2017-12-01 PROCEDURE — 25000132 ZZH RX MED GY IP 250 OP 250 PS 637: Mod: GY | Performed by: STUDENT IN AN ORGANIZED HEALTH CARE EDUCATION/TRAINING PROGRAM

## 2017-12-01 PROCEDURE — 25000565 ZZH ISOFLURANE, EA 15 MIN: Performed by: OBSTETRICS & GYNECOLOGY

## 2017-12-01 PROCEDURE — 36415 COLL VENOUS BLD VENIPUNCTURE: CPT | Performed by: ANESTHESIOLOGY

## 2017-12-01 PROCEDURE — 36000064 ZZH SURGERY LEVEL 4 EA 15 ADDTL MIN - UMMC: Performed by: OBSTETRICS & GYNECOLOGY

## 2017-12-01 PROCEDURE — 25000128 H RX IP 250 OP 636: Performed by: RESIDENTIAL TREATMENT FACILITY, PHYSICAL DISABILITIES

## 2017-12-01 PROCEDURE — 88307 TISSUE EXAM BY PATHOLOGIST: CPT | Performed by: OBSTETRICS & GYNECOLOGY

## 2017-12-01 PROCEDURE — 25000125 ZZHC RX 250: Performed by: STUDENT IN AN ORGANIZED HEALTH CARE EDUCATION/TRAINING PROGRAM

## 2017-12-01 PROCEDURE — 88342 IMHCHEM/IMCYTCHM 1ST ANTB: CPT | Performed by: OBSTETRICS & GYNECOLOGY

## 2017-12-01 PROCEDURE — 27210794 ZZH OR GENERAL SUPPLY STERILE: Performed by: OBSTETRICS & GYNECOLOGY

## 2017-12-01 PROCEDURE — 25000125 ZZHC RX 250: Performed by: OBSTETRICS & GYNECOLOGY

## 2017-12-01 PROCEDURE — 88341 IMHCHEM/IMCYTCHM EA ADD ANTB: CPT | Performed by: OBSTETRICS & GYNECOLOGY

## 2017-12-01 PROCEDURE — 86901 BLOOD TYPING SEROLOGIC RH(D): CPT | Performed by: ANESTHESIOLOGY

## 2017-12-01 RX ORDER — AMOXICILLIN 250 MG
1-2 CAPSULE ORAL 2 TIMES DAILY PRN
Qty: 60 TABLET | Refills: 0 | Status: SHIPPED | OUTPATIENT
Start: 2017-12-01 | End: 2018-01-08

## 2017-12-01 RX ORDER — HYDROMORPHONE HCL/0.9% NACL/PF 0.2MG/0.2
0.2 SYRINGE (ML) INTRAVENOUS
Status: DISCONTINUED | OUTPATIENT
Start: 2017-12-01 | End: 2017-12-02 | Stop reason: HOSPADM

## 2017-12-01 RX ORDER — SODIUM CHLORIDE, SODIUM LACTATE, POTASSIUM CHLORIDE, CALCIUM CHLORIDE 600; 310; 30; 20 MG/100ML; MG/100ML; MG/100ML; MG/100ML
INJECTION, SOLUTION INTRAVENOUS CONTINUOUS
Status: DISCONTINUED | OUTPATIENT
Start: 2017-12-01 | End: 2017-12-01

## 2017-12-01 RX ORDER — ONDANSETRON 4 MG/1
4-8 TABLET, ORALLY DISINTEGRATING ORAL EVERY 8 HOURS PRN
Qty: 4 TABLET | Refills: 0 | Status: SHIPPED | OUTPATIENT
Start: 2017-12-01 | End: 2018-01-08

## 2017-12-01 RX ORDER — CEFAZOLIN SODIUM 1 G/3ML
1 INJECTION, POWDER, FOR SOLUTION INTRAMUSCULAR; INTRAVENOUS SEE ADMIN INSTRUCTIONS
Status: DISCONTINUED | OUTPATIENT
Start: 2017-12-01 | End: 2017-12-01 | Stop reason: HOSPADM

## 2017-12-01 RX ORDER — METOCLOPRAMIDE HYDROCHLORIDE 5 MG/ML
5 INJECTION INTRAMUSCULAR; INTRAVENOUS EVERY 6 HOURS PRN
Status: DISCONTINUED | OUTPATIENT
Start: 2017-12-01 | End: 2017-12-02 | Stop reason: HOSPADM

## 2017-12-01 RX ORDER — FENTANYL CITRATE 50 UG/ML
25-50 INJECTION, SOLUTION INTRAMUSCULAR; INTRAVENOUS
Status: DISCONTINUED | OUTPATIENT
Start: 2017-12-01 | End: 2017-12-01 | Stop reason: HOSPADM

## 2017-12-01 RX ORDER — SODIUM CHLORIDE, SODIUM LACTATE, POTASSIUM CHLORIDE, CALCIUM CHLORIDE 600; 310; 30; 20 MG/100ML; MG/100ML; MG/100ML; MG/100ML
INJECTION, SOLUTION INTRAVENOUS CONTINUOUS
Status: DISCONTINUED | OUTPATIENT
Start: 2017-12-01 | End: 2017-12-01 | Stop reason: HOSPADM

## 2017-12-01 RX ORDER — PROPOFOL 10 MG/ML
INJECTION, EMULSION INTRAVENOUS PRN
Status: DISCONTINUED | OUTPATIENT
Start: 2017-12-01 | End: 2017-12-01

## 2017-12-01 RX ORDER — ONDANSETRON 4 MG/1
4 TABLET, ORALLY DISINTEGRATING ORAL EVERY 30 MIN PRN
Status: DISCONTINUED | OUTPATIENT
Start: 2017-12-01 | End: 2017-12-01

## 2017-12-01 RX ORDER — OXYCODONE HYDROCHLORIDE 5 MG/1
5 TABLET ORAL
Status: DISCONTINUED | OUTPATIENT
Start: 2017-12-01 | End: 2017-12-02 | Stop reason: HOSPADM

## 2017-12-01 RX ORDER — ONDANSETRON 2 MG/ML
INJECTION INTRAMUSCULAR; INTRAVENOUS PRN
Status: DISCONTINUED | OUTPATIENT
Start: 2017-12-01 | End: 2017-12-01

## 2017-12-01 RX ORDER — FENTANYL CITRATE 50 UG/ML
25-50 INJECTION, SOLUTION INTRAMUSCULAR; INTRAVENOUS
Status: DISCONTINUED | OUTPATIENT
Start: 2017-12-01 | End: 2017-12-01

## 2017-12-01 RX ORDER — OXYCODONE AND ACETAMINOPHEN 5; 325 MG/1; MG/1
1-2 TABLET ORAL EVERY 4 HOURS PRN
Qty: 10 TABLET | Refills: 0 | Status: SHIPPED | OUTPATIENT
Start: 2017-12-01 | End: 2018-01-08

## 2017-12-01 RX ORDER — BUPIVACAINE HYDROCHLORIDE 2.5 MG/ML
INJECTION, SOLUTION EPIDURAL; INFILTRATION; INTRACAUDAL PRN
Status: DISCONTINUED | OUTPATIENT
Start: 2017-12-01 | End: 2017-12-01

## 2017-12-01 RX ORDER — FENTANYL CITRATE 50 UG/ML
INJECTION, SOLUTION INTRAMUSCULAR; INTRAVENOUS PRN
Status: DISCONTINUED | OUTPATIENT
Start: 2017-12-01 | End: 2017-12-01

## 2017-12-01 RX ORDER — CEFAZOLIN SODIUM 2 G/100ML
2 INJECTION, SOLUTION INTRAVENOUS
Status: COMPLETED | OUTPATIENT
Start: 2017-12-01 | End: 2017-12-01

## 2017-12-01 RX ORDER — AMOXICILLIN 250 MG
1-2 CAPSULE ORAL 2 TIMES DAILY
Qty: 30 TABLET | Refills: 0 | Status: SHIPPED | OUTPATIENT
Start: 2017-12-01 | End: 2018-01-08

## 2017-12-01 RX ORDER — METOCLOPRAMIDE 5 MG/1
5 TABLET ORAL EVERY 6 HOURS PRN
Status: DISCONTINUED | OUTPATIENT
Start: 2017-12-01 | End: 2017-12-02 | Stop reason: HOSPADM

## 2017-12-01 RX ORDER — SIMETHICONE 80 MG
80 TABLET,CHEWABLE ORAL EVERY 6 HOURS PRN
Status: DISCONTINUED | OUTPATIENT
Start: 2017-12-01 | End: 2017-12-02 | Stop reason: HOSPADM

## 2017-12-01 RX ORDER — HYDROMORPHONE HYDROCHLORIDE 1 MG/ML
.3-.5 INJECTION, SOLUTION INTRAMUSCULAR; INTRAVENOUS; SUBCUTANEOUS EVERY 5 MIN PRN
Status: DISCONTINUED | OUTPATIENT
Start: 2017-12-01 | End: 2017-12-01

## 2017-12-01 RX ORDER — IBUPROFEN 600 MG/1
600 TABLET, FILM COATED ORAL EVERY 6 HOURS PRN
Qty: 60 TABLET | Refills: 0 | Status: SHIPPED | OUTPATIENT
Start: 2017-12-01 | End: 2018-01-08

## 2017-12-01 RX ORDER — ACETAMINOPHEN 325 MG/1
650 TABLET ORAL EVERY 6 HOURS PRN
Status: DISCONTINUED | OUTPATIENT
Start: 2017-12-01 | End: 2017-12-02 | Stop reason: HOSPADM

## 2017-12-01 RX ORDER — ONDANSETRON 2 MG/ML
4 INJECTION INTRAMUSCULAR; INTRAVENOUS EVERY 6 HOURS PRN
Status: DISCONTINUED | OUTPATIENT
Start: 2017-12-01 | End: 2017-12-02 | Stop reason: HOSPADM

## 2017-12-01 RX ORDER — IBUPROFEN 600 MG/1
600 TABLET, FILM COATED ORAL EVERY 6 HOURS PRN
Qty: 30 TABLET | Refills: 0 | Status: SHIPPED | OUTPATIENT
Start: 2017-12-01 | End: 2018-01-08

## 2017-12-01 RX ORDER — PHENAZOPYRIDINE HYDROCHLORIDE 200 MG/1
200 TABLET, FILM COATED ORAL ONCE
Status: COMPLETED | OUTPATIENT
Start: 2017-12-01 | End: 2017-12-01

## 2017-12-01 RX ORDER — NALOXONE HYDROCHLORIDE 0.4 MG/ML
.1-.4 INJECTION, SOLUTION INTRAMUSCULAR; INTRAVENOUS; SUBCUTANEOUS
Status: DISCONTINUED | OUTPATIENT
Start: 2017-12-01 | End: 2017-12-02 | Stop reason: HOSPADM

## 2017-12-01 RX ORDER — IBUPROFEN 200 MG
600 TABLET ORAL EVERY 6 HOURS PRN
Status: DISCONTINUED | OUTPATIENT
Start: 2017-12-01 | End: 2017-12-02 | Stop reason: HOSPADM

## 2017-12-01 RX ORDER — ONDANSETRON 4 MG/1
4 TABLET, ORALLY DISINTEGRATING ORAL EVERY 6 HOURS PRN
Status: DISCONTINUED | OUTPATIENT
Start: 2017-12-01 | End: 2017-12-02 | Stop reason: HOSPADM

## 2017-12-01 RX ORDER — IBUPROFEN 600 MG/1
600 TABLET, FILM COATED ORAL
Status: DISCONTINUED | OUTPATIENT
Start: 2017-12-01 | End: 2017-12-02 | Stop reason: HOSPADM

## 2017-12-01 RX ORDER — INDOCYANINE GREEN AND WATER 25 MG
KIT INJECTION PRN
Status: DISCONTINUED | OUTPATIENT
Start: 2017-12-01 | End: 2017-12-01

## 2017-12-01 RX ORDER — DEXAMETHASONE SODIUM PHOSPHATE 10 MG/ML
INJECTION, SOLUTION INTRAMUSCULAR; INTRAVENOUS PRN
Status: DISCONTINUED | OUTPATIENT
Start: 2017-12-01 | End: 2017-12-01

## 2017-12-01 RX ORDER — FLUMAZENIL 0.1 MG/ML
0.2 INJECTION, SOLUTION INTRAVENOUS
Status: DISCONTINUED | OUTPATIENT
Start: 2017-12-01 | End: 2017-12-01 | Stop reason: HOSPADM

## 2017-12-01 RX ORDER — OXYCODONE HYDROCHLORIDE 5 MG/1
5 TABLET ORAL EVERY 4 HOURS PRN
Qty: 12 TABLET | Refills: 0 | Status: SHIPPED | OUTPATIENT
Start: 2017-12-01 | End: 2018-01-08

## 2017-12-01 RX ORDER — SODIUM CHLORIDE 9 MG/ML
INJECTION, SOLUTION INTRAVENOUS CONTINUOUS
Status: ACTIVE | OUTPATIENT
Start: 2017-12-01 | End: 2017-12-02

## 2017-12-01 RX ORDER — ONDANSETRON 2 MG/ML
4 INJECTION INTRAMUSCULAR; INTRAVENOUS EVERY 30 MIN PRN
Status: DISCONTINUED | OUTPATIENT
Start: 2017-12-01 | End: 2017-12-01

## 2017-12-01 RX ORDER — AMOXICILLIN 250 MG
1-2 CAPSULE ORAL 2 TIMES DAILY PRN
Status: DISCONTINUED | OUTPATIENT
Start: 2017-12-01 | End: 2017-12-02 | Stop reason: HOSPADM

## 2017-12-01 RX ORDER — NALOXONE HYDROCHLORIDE 0.4 MG/ML
.1-.4 INJECTION, SOLUTION INTRAMUSCULAR; INTRAVENOUS; SUBCUTANEOUS
Status: DISCONTINUED | OUTPATIENT
Start: 2017-12-01 | End: 2017-12-01 | Stop reason: HOSPADM

## 2017-12-01 RX ORDER — PROCHLORPERAZINE MALEATE 5 MG
5 TABLET ORAL EVERY 6 HOURS PRN
Status: DISCONTINUED | OUTPATIENT
Start: 2017-12-01 | End: 2017-12-02 | Stop reason: HOSPADM

## 2017-12-01 RX ADMIN — FENTANYL CITRATE 25 MCG: 50 INJECTION, SOLUTION INTRAMUSCULAR; INTRAVENOUS at 12:08

## 2017-12-01 RX ADMIN — ROCURONIUM BROMIDE 10 MG: 10 INJECTION INTRAVENOUS at 14:29

## 2017-12-01 RX ADMIN — FENTANYL CITRATE 50 MCG: 50 INJECTION, SOLUTION INTRAMUSCULAR; INTRAVENOUS at 14:41

## 2017-12-01 RX ADMIN — PHENAZOPYRIDINE 200 MG: 200 TABLET ORAL at 11:52

## 2017-12-01 RX ADMIN — ROCURONIUM BROMIDE 10 MG: 10 INJECTION INTRAVENOUS at 13:46

## 2017-12-01 RX ADMIN — ROCURONIUM BROMIDE 10 MG: 10 INJECTION INTRAVENOUS at 13:55

## 2017-12-01 RX ADMIN — SODIUM CHLORIDE, POTASSIUM CHLORIDE, SODIUM LACTATE AND CALCIUM CHLORIDE: 600; 310; 30; 20 INJECTION, SOLUTION INTRAVENOUS at 12:28

## 2017-12-01 RX ADMIN — RANITIDINE 150 MG: 150 TABLET ORAL at 22:01

## 2017-12-01 RX ADMIN — ROCURONIUM BROMIDE 40 MG: 10 INJECTION INTRAVENOUS at 12:39

## 2017-12-01 RX ADMIN — FENTANYL CITRATE 25 MCG: 50 INJECTION, SOLUTION INTRAMUSCULAR; INTRAVENOUS at 12:17

## 2017-12-01 RX ADMIN — SODIUM CHLORIDE: 9 INJECTION, SOLUTION INTRAVENOUS at 22:01

## 2017-12-01 RX ADMIN — ONDANSETRON 4 MG: 2 INJECTION INTRAMUSCULAR; INTRAVENOUS at 22:06

## 2017-12-01 RX ADMIN — FENTANYL CITRATE 50 MCG: 50 INJECTION, SOLUTION INTRAMUSCULAR; INTRAVENOUS at 12:28

## 2017-12-01 RX ADMIN — ACETAMINOPHEN 650 MG: 325 TABLET, FILM COATED ORAL at 22:25

## 2017-12-01 RX ADMIN — CEFAZOLIN 1 G: 1 INJECTION, POWDER, FOR SOLUTION INTRAMUSCULAR; INTRAVENOUS at 14:50

## 2017-12-01 RX ADMIN — FENTANYL CITRATE 100 MCG: 50 INJECTION, SOLUTION INTRAMUSCULAR; INTRAVENOUS at 13:32

## 2017-12-01 RX ADMIN — ONDANSETRON 4 MG: 2 INJECTION INTRAMUSCULAR; INTRAVENOUS at 17:32

## 2017-12-01 RX ADMIN — FENTANYL CITRATE 50 MCG: 50 INJECTION, SOLUTION INTRAMUSCULAR; INTRAVENOUS at 15:53

## 2017-12-01 RX ADMIN — ROCURONIUM BROMIDE 10 MG: 10 INJECTION INTRAVENOUS at 13:34

## 2017-12-01 RX ADMIN — ROCURONIUM BROMIDE 10 MG: 10 INJECTION INTRAVENOUS at 13:23

## 2017-12-01 RX ADMIN — ROCURONIUM BROMIDE 10 MG: 10 INJECTION INTRAVENOUS at 16:19

## 2017-12-01 RX ADMIN — DEXAMETHASONE SODIUM PHOSPHATE 6 MG: 10 INJECTION, SOLUTION INTRAMUSCULAR; INTRAVENOUS at 13:11

## 2017-12-01 RX ADMIN — FENTANYL CITRATE 50 MCG: 50 INJECTION, SOLUTION INTRAMUSCULAR; INTRAVENOUS at 12:59

## 2017-12-01 RX ADMIN — PROPOFOL 120 MG: 10 INJECTION, EMULSION INTRAVENOUS at 12:39

## 2017-12-01 RX ADMIN — CEFAZOLIN SODIUM 2 G: 2 INJECTION, SOLUTION INTRAVENOUS at 12:53

## 2017-12-01 RX ADMIN — SUGAMMADEX 160 MG: 100 INJECTION, SOLUTION INTRAVENOUS at 16:44

## 2017-12-01 RX ADMIN — ONDANSETRON 4 MG: 2 INJECTION INTRAMUSCULAR; INTRAVENOUS at 16:32

## 2017-12-01 RX ADMIN — ROCURONIUM BROMIDE 10 MG: 10 INJECTION INTRAVENOUS at 14:52

## 2017-12-01 RX ADMIN — Medication 0.3 MG: at 17:32

## 2017-12-01 RX ADMIN — SODIUM CHLORIDE, POTASSIUM CHLORIDE, SODIUM LACTATE AND CALCIUM CHLORIDE: 600; 310; 30; 20 INJECTION, SOLUTION INTRAVENOUS at 15:00

## 2017-12-01 RX ADMIN — ROCURONIUM BROMIDE 10 MG: 10 INJECTION INTRAVENOUS at 15:49

## 2017-12-01 RX ADMIN — BUPIVACAINE 20 ML: 13.3 INJECTION, SUSPENSION, LIPOSOMAL INFILTRATION at 12:20

## 2017-12-01 RX ADMIN — BUPIVACAINE HYDROCHLORIDE 20 ML: 2.5 INJECTION, SOLUTION EPIDURAL; INFILTRATION; INTRACAUDAL at 12:20

## 2017-12-01 RX ADMIN — ROCURONIUM BROMIDE 10 MG: 10 INJECTION INTRAVENOUS at 15:22

## 2017-12-01 ASSESSMENT — ACTIVITIES OF DAILY LIVING (ADL)
DRESS: 0-->INDEPENDENT
FALL_HISTORY_WITHIN_LAST_SIX_MONTHS: NO
TOILETING: 0-->INDEPENDENT
COGNITION: 0 - NO COGNITION ISSUES REPORTED
SWALLOWING: 0-->SWALLOWS FOODS/LIQUIDS WITHOUT DIFFICULTY
TRANSFERRING: 0-->INDEPENDENT
RETIRED_COMMUNICATION: 0-->UNDERSTANDS/COMMUNICATES WITHOUT DIFFICULTY
BATHING: 0-->INDEPENDENT
AMBULATION: 0-->INDEPENDENT
RETIRED_EATING: 0-->INDEPENDENT

## 2017-12-01 NOTE — IP AVS SNAPSHOT
MRN:[id number]                      After Visit Summary   12/1/2017    Shani Tinoco    MRN: [id number]           Thank you!     Thank you for choosing [facility] for your care. Our goal is always to provide you with excellent care. Hearing back from our patients is one way we can continue to improve our services. Please take a few minutes to complete the written survey that you may receive in the mail after you visit with us. Thank you!        Patient Information     Date Of Birth          1940        Designated Caregiver       Most Recent Value    Caregiver    Will someone help with your care after discharge? yes    Name of designated caregiver diseree    Phone number of caregiver same    Caregiver address same      About your hospital stay     You were admitted on:  December 1, 2017 You last received care in the:  Unit 6D Observation [facility]    You were discharged on:  December 2, 2017       Who to Call     For medical emergencies, please call 911.  For non-urgent questions about your medical care, please call your primary care provider or clinic, [phone]  For questions related to your surgery, please call your surgery clinic        Attending Provider     Provider Specialty    Kt Degroot MD Oncology    Jadye Valenzuela MD Gyn-Onc       Primary Care Provider Office Phone # Fax #    Daniella Rodrigues [phone]      After Care Instructions     Discharge Instructions       Resume pre procedure diet            Discharge Instructions       Pelvic Rest. No tampons, douching or intercourse for  6 weeks.            Discharge Instructions - diet       Resume pre procedure diet.            Dressing       Keep dressing clean and dry, change as instructed by Provider or RN            Dressing       Keep dressing clean and dry.  Dressing / incision care as instructed by RN and or MD.            NO lifting       NO lifting over15 lbs and no strenuous  physical activity for  6  weeks.            No driving or operating machinery       No driving or operating machinery until day after procedure. No driving while taking narcotics.            Shower        Shower on Post-op day  1.   DO NOT take a bath                  Your next 10 appointments already scheduled     Dec 18, 2017  3:00 PM CST   (Arrive by 2:45 PM)   Return Visit with Kt Degroot MD   [facility] ([facility] [place])    [address]  2nd Floor  [place] [address] [phone]              Additional Services     Home Health Care Referral       **Order classes of: [facility], [facility] and [facility] will route to the Home Care and Hospice Referral Pool.  Home Care or Hospice will then contact the patient to schedule their appointment.**    If you do not hear from Home Care and Hospice, or you would like to call to schedule, please call the referring place of service that your provider has listed below.  ______________________________________________________________________    Your provider has referred you to: [facility]: [facility] [place] [phone]   [email or web address]    Extended Emergency Contact Information  Primary Emergency Contact: BonnerItzel   [facility]  Mobile Phone: [phone]  Relation: Grandchild  Secondary Emergency Contact: KEVIN COOLEY   [facility]  Home Phone: [phone]  Relation: Daughter    Patient Anticipated Discharge Date: 12/2/2017   RN, PT, HHA to begin 24 - 48 hours after discharge.  PLEASE EVALUATE AND TREAT (Evaluation timeline is 24 - 48 hrs. Please call if there is need for a variance to this timeline).    REASON FOR REFERRAL: Assessment & Treatment: RN, oxygen desaturation at night. Needs one time oxygen monitoring at home.     ADDITIONAL SERVICES NEEDED: None    OTHER PERTINENT INFORMATION: Patient was last seen by provider on 12/2  for surgery.    Current Outpatient Prescriptions:  ibuprofen (ADVIL/MOTRIN) 600 MG tablet, Take 1 tablet (600 mg) by mouth every 6 hours as needed for pain (mild), Disp: 30 tablet, Rfl: 0  oxyCODONE-acetaminophen (PERCOCET) 5-325 MG per tablet, Take 1-2 tablets by mouth every 4 hours as needed for pain (moderate to severe), Disp: 10 tablet, Rfl: 0  senna-docusate (SENOKOT-S;PERICOLACE) 8.6-50 MG per tablet, Take 1-2 tablets by mouth 2 times daily Take while on oral narcotics to prevent or treat constipation., Disp: 30 tablet, Rfl: 0  ondansetron (ZOFRAN-ODT) 4 MG ODT tab, Take 1-2 tablets (4-8 mg) by mouth every 8 hours as needed for nausea Dissolve ON the tongue., Disp: 4 tablet, Rfl: 0  ibuprofen (ADVIL/MOTRIN) 600 MG tablet, Take 1 tablet (600 mg) by mouth every 6 hours as needed, Disp: 60 tablet, Rfl: 0  oxyCODONE IR (ROXICODONE) 5 MG tablet, Take 1 tablet (5 mg) by mouth every 4 hours as needed for moderate to severe pain, Disp: 12 tablet, Rfl: 0  senna-docusate (SENOKOT-S;PERICOLACE) 8.6-50 MG per tablet, Take 1-2 tablets by mouth 2 times daily as needed for constipation, Disp: 60 tablet, Rfl: 0      Patient Active Problem List:     S/P laparoscopic hysterectomy      Documentation of Face to Face and Certification for Home Health Services    I certify that patient, Shani Tinoco is under my care and that I, or a Nurse Practitioner or Physician's Assistant working with me, had a face-to-face encounter that meets the physician face-to-face encounter requirements with this patient on: 12/2/2017.    This encounter with the patient was in whole, or in part, for the following medical condition, which is the primary reason for Home Health Care: Oxygen desaturation.    I certify that, based on my findings, the following services are medically necessary Home Health Services: Nursing    My clinical findings support the need for the above services because: Nurse is needed: for oxygen saturation monitoring, one  "time.    Further, I certify that my clinical findings support that this patient is homebound (i.e. absences from home require considerable and taxing effort and are for medical reasons or [employer] services or infrequently or of short duration when for other reasons) because:     Based on the above findings, I certify that this patient is confined to the home and needs intermittent skilled nursing care, physical therapy and/or speech therapy.  The patient is under my care, and I have initiated the establishment of the plan of care.  This patient will be followed by a physician who will periodically review the plan of care.    Physician/Provider to provide follow up care: Daniella Rodrigues certified Physician at time of discharge: Jayde Ricardo    Please be aware that coverage of these services is subject to the terms and limitations of your health insurance plan.  Call member services at your health plan with any benefit or coverage questions.            SLEEP HOME STUDY REFERRAL                 Pending Results     Date and Time Order Name Status Description    12/1/2017 1404 Surgical pathology exam Preliminary     12/1/2017 1317 Cytology non gyn In process             Admission Information     Date & Time Provider Department Dept. Phone    12/1/2017 Jayde Valenzuela MD Unit 6D Observation [facility] [place] [phone]      Your Vitals Were     Blood Pressure Temperature Respirations Height Weight Pulse Oximetry    116/67 (BP Location: Right arm) 97.9  F (36.6  C) (Oral) 16 1.52 m (4' 11.84\") 80.7 kg (177 lb 14.6 oz) 95%    BMI (Body Mass Index)                   34.93 kg/m2           Dittit Information     Dittit lets you send messages to your doctor, view your test results, renew your prescriptions, schedule appointments and more. To sign up, go to [email or web address] . Click on \"Log in\" on the left side of the screen, which will take you to the Welcome page. Then click on \"Sign " "up Now\" on the right side of the page.     You will be asked to enter the access code listed below, as well as some personal information. Please follow the directions to create your username and password.     Your access code is: [email or web address]  Expires: 2018  7:45 PM     Your access code will  in 90 days. If you need help or a new code, please call your [facility] clinic or [phone].        Care EveryWhere ID     This is your Care EveryWhere ID. This could be used by other organizations to access your [facility] medical records  [id number]        Equal Access to Services     [facility]: Hadaditya Finney, tha lemus, deb pandey, abril pugh . So [facility] [phone].    ATENCIÓN: Si habla español, tiene a [place] disposición servicios gratuitos de asistencia lingüística. Llame al [phone].    We comply with applicable federal civil rights laws and [place] laws. We do not discriminate on the basis of race, color, national origin, age, disability, sex, sexual orientation, or gender identity.               Review of your medicines      START taking        Dose / Directions    * ibuprofen 600 MG tablet   Commonly known as:  ADVIL/MOTRIN        Dose:  600 mg   Take 1 tablet (600 mg) by mouth every 6 hours as needed for pain (mild)   Quantity:  30 tablet   Refills:  0       * ibuprofen 600 MG tablet   Commonly known as:  ADVIL/MOTRIN        Dose:  600 mg   Take 1 tablet (600 mg) by mouth every 6 hours as needed   Quantity:  60 tablet   Refills:  0       ondansetron 4 MG ODT tab   Commonly known as:  ZOFRAN-ODT        Dose:  4-8 mg   Take 1-2 tablets (4-8 mg) by mouth every 8 hours as needed for nausea Dissolve ON the tongue.   Quantity:  4 tablet   Refills:  0       oxyCODONE IR 5 MG tablet   Commonly known as:  ROXICODONE        Dose:  5 mg   Take 1 tablet (5 mg) by mouth every 4 hours as needed for moderate to severe pain   Quantity:  12 tablet "   Refills:  0       oxyCODONE-acetaminophen 5-325 MG per tablet   Commonly known as:  PERCOCET        Dose:  1-2 tablet   Take 1-2 tablets by mouth every 4 hours as needed for pain (moderate to severe)   Quantity:  10 tablet   Refills:  0       * senna-docusate 8.6-50 MG per tablet   Commonly known as:  SENOKOT-S;PERICOLACE        Dose:  1-2 tablet   Take 1-2 tablets by mouth 2 times daily Take while on oral narcotics to prevent or treat constipation.   Quantity:  30 tablet   Refills:  0       * senna-docusate 8.6-50 MG per tablet   Commonly known as:  SENOKOT-S;PERICOLACE        Dose:  1-2 tablet   Take 1-2 tablets by mouth 2 times daily as needed for constipation   Quantity:  60 tablet   Refills:  0       * Notice:  This list has 4 medication(s) that are the same as other medications prescribed for you. Read the directions carefully, and ask your doctor or other care provider to review them with you.      CONTINUE these medicines which have NOT CHANGED        Dose / Directions    aspirin EC 81 MG EC tablet        Dose:  81 mg   Take 81 mg by mouth daily   Refills:  0       cholecalciferol 1000 UNIT tablet   Commonly known as:  vitamin D3        Dose:  1000 Units   Take 1,000 Units by mouth   Refills:  0       fish oil omega-3 fatty acid 320MG/ML oral suspension        Dose:  1000 mg   Take 1,000 mg by mouth   Refills:  0       losartan-hydrochlorothiazide 100-25 MG per tablet   Commonly known as:  HYZAAR        Dose:  1 tablet   Take 1 tablet by mouth   Refills:  0       megestrol 40 MG tablet   Commonly known as:  MEGACE        Dose:  40 mg   Take 40 mg by mouth   Refills:  0       rosuvastatin 40 MG tablet   Commonly known as:  CRESTOR        Dose:  40 mg   Take 40 mg by mouth   Refills:  0            Where to get your medicines      These medications were sent to [facility] [place], [place] - [address]  [address] [address]     Phone:  [phone]     ibuprofen  600 MG tablet    senna-docusate 8.6-50 MG per tablet         Some of these will need a paper prescription and others can be bought over the counter. Ask your nurse if you have questions.     Bring a paper prescription for each of these medications     ibuprofen 600 MG tablet    ondansetron 4 MG ODT tab    oxyCODONE IR 5 MG tablet    oxyCODONE-acetaminophen 5-325 MG per tablet    senna-docusate 8.6-50 MG per tablet                Protect others around you: Learn how to safely use, store and throw away your medicines at www.disposemymeds.org.             Medication List: This is a list of all your medications and when to take them. Check marks below indicate your daily home schedule. Keep this list as a reference.      Medications           Morning Afternoon Evening Bedtime As Needed    aspirin EC 81 MG EC tablet   Take 81 mg by mouth daily                                cholecalciferol 1000 UNIT tablet   Commonly known as:  vitamin D3   Take 1,000 Units by mouth                                fish oil omega-3 fatty acid 320MG/ML oral suspension   Take 1,000 mg by mouth                                * ibuprofen 600 MG tablet   Commonly known as:  ADVIL/MOTRIN   Take 1 tablet (600 mg) by mouth every 6 hours as needed for pain (mild)                                * ibuprofen 600 MG tablet   Commonly known as:  ADVIL/MOTRIN   Take 1 tablet (600 mg) by mouth every 6 hours as needed                                losartan-hydrochlorothiazide 100-25 MG per tablet   Commonly known as:  HYZAAR   Take 1 tablet by mouth                                megestrol 40 MG tablet   Commonly known as:  MEGACE   Take 40 mg by mouth                                ondansetron 4 MG ODT tab   Commonly known as:  ZOFRAN-ODT   Take 1-2 tablets (4-8 mg) by mouth every 8 hours as needed for nausea Dissolve ON the tongue.                                oxyCODONE IR 5 MG tablet   Commonly known as:  ROXICODONE   Take 1 tablet (5 mg) by mouth  every 4 hours as needed for moderate to severe pain   Last time this was given:  5 mg on 12/2/2017  9:38 AM                                oxyCODONE-acetaminophen 5-325 MG per tablet   Commonly known as:  PERCOCET   Take 1-2 tablets by mouth every 4 hours as needed for pain (moderate to severe)                                rosuvastatin 40 MG tablet   Commonly known as:  CRESTOR   Take 40 mg by mouth                                * senna-docusate 8.6-50 MG per tablet   Commonly known as:  SENOKOT-S;PERICOLACE   Take 1-2 tablets by mouth 2 times daily Take while on oral narcotics to prevent or treat constipation.                                * senna-docusate 8.6-50 MG per tablet   Commonly known as:  SENOKOT-S;PERICOLACE   Take 1-2 tablets by mouth 2 times daily as needed for constipation                                * Notice:  This list has 4 medication(s) that are the same as other medications prescribed for you. Read the directions carefully, and ask your doctor or other care provider to review them with you.

## 2017-12-01 NOTE — ANESTHESIA CARE TRANSFER NOTE
Patient: Shani Tinoco    Procedure(s):  Laparoscopic Total Hysterectomy, Bilateral Salpingo-Oophorectomy, Lymph Node Dissection, Anesthesia Block - Wound Class: II-Clean Contaminated   - Wound Class: II-Clean Contaminated   - Wound Class: II-Clean Contaminated   - Wound Class: II-Clean Contaminated    Diagnosis: Endometrial Cancer   Diagnosis Additional Information: No value filed.    Anesthesia Type:   General, ETT, Periph. Nerve Block for postop pain     Note:  Airway :Face Mask  Patient transferred to:PACU  Comments: To PACU on supplemental O2 with + air exchange, placed on monitor. Report given to RN, questions answered, VSS, patient comfortable.Handoff Report: Identifed the Patient, Identified the Reponsible Provider, Reviewed the pertinent medical history, Discussed the surgical course, Reviewed Intra-OP anesthesia mangement and issues during anesthesia, Set expectations for post-procedure period and Allowed opportunity for questions and acknowledgement of understanding      Vitals: (Last set prior to Anesthesia Care Transfer)    CRNA VITALS  12/1/2017 1620 - 12/1/2017 1700      12/1/2017             Resp Rate (observed): (!)  2                Electronically Signed By: MAREK Cochran CRNA  December 1, 2017  5:00 PM

## 2017-12-01 NOTE — BRIEF OP NOTE
[facility] [place]    Brief Operative Note    Pre-operative diagnosis: Endometrial Cancer    Post-operative diagnosis Same as above   Procedure: Procedure(s):  Laparoscopic Total Hysterectomy, Bilateral Salpingo-Oophorectomy, Lymph Node Dissection, Anesthesia Block - Wound Class: II-Clean Contaminated   - Wound Class: II-Clean Contaminated   - Wound Class: II-Clean Contaminated   - Wound Class: II-Clean Contaminated   Surgeon: Dr. Degroto   Assistants(s): Marcos Gutierrez, Fellow  Edvin Sanches, PGY-4  David Roy, PGY-2   Anesthesia: Combined General with Block    Estimated blood loss: 100 ml   Total IV fluids: 1500 ml crystalloid           Drains: None   Specimens:   ID Type Source Tests Collected by Time Destination   1 : Pelvic Washings Washings Pelvis CYTOLOGY NON GYN Kt Degroot MD 12/1/2017  1:17 PM    A : Cervix, Uterus, Bilateral Fallopian Tubes and Ovaries Organ Uterus with Bilateral Ovaries and Fallopian Tubes SURGICAL PATHOLOGY EXAM Kt Degroot MD 12/1/2017  2:03 PM    B : Right Pelvic Lymph Nodes Tissue Lymph Node, Right Pelvic SURGICAL PATHOLOGY EXAM Kt Degroot MD 12/1/2017  3:13 PM    C : Left Pelvic Lymph Nodes Tissue Lymph Node, Left Pelvic SURGICAL PATHOLOGY EXAM Kt Degroot MD 12/1/2017  3:36 PM    D : Para-Aortic Lymph Nodes Tissue Lymph Node, Para-Aortic (6) SURGICAL PATHOLOGY EXAM Kt Degroot MD 12/1/2017  3:39 PM       Findings: 1. EUA/SSE: normal external genitalia, normal urethral orifice, mobile multiparous cervix with approximately 5 mm soft mass extruding from external os, mobile uterus, no adnexal masses appreciated  2. Laparoscopy: bowel adherent to left pelvic side wall and left ovary and fallopian tube. Left ovary adhesed within ovarian fossa. Adhesions taken down bluntly and sharply. Adhesions also noted between posterior aspect of lower uterus and posterior cul-de-sac. Normal appearing  uterus, bilateral fallopian tubes and ovaries. Normal appearing liver edge, diaphragm, greater curvature of stomach and bowel on brief survey. Diverticular disease noted within colon.  3. Cystoscopy: brisk efflux from bilateral ureteral orifices  4. Proctoscopy: no air bubbles visualized, reassuring for intact rectum and bowel. On rectal exam, no sutures or defects palpated.   Complications: None     David Roy MD  OB/GYN Resident, PGY-2  12/1/2017 4:42 PM

## 2017-12-01 NOTE — OR NURSING
Pt tolerated TAP block without incident.  See Regional BLOCK Timeout Tab.  Transferred to OR immediatly upon block completion.  PT reminded to not attempt getting out of bed without assistants.  Family now in room with pt.  See flowsheet and EMAR for additional information.

## 2017-12-01 NOTE — ANESTHESIA PROCEDURE NOTES
Peripheral Nerve Block Procedure Note    Staff:     Anesthesiologist:  RAFAT CROFT  Location: Pre-op  Procedure Start/Stop TImes:      12/1/2017 12:14 PM     12/1/2017 12:22 PM    patient identified, IV checked, site marked, risks and benefits discussed, informed consent, monitors and equipment checked, pre-op evaluation, at physician/surgeon's request and post-op pain management      Correct Patient: Yes      Correct Position: Yes      Correct Site: Yes      Correct Procedure: Yes      Correct Laterality:  N/A    Site Marked:  Yes  Procedure details:     Procedure:  TAP    ASA:  3    Diagnosis:  Post operative Pain    Laterality:  Bilateral    Position:  Supine    Sterile Prep: chloraprep, mask and sterile gloves      Local skin infiltration:  1% lidocaine    amount (mL):  2    Needle:  Short bevel    Needle length (mm):  110    Ultrasound: Yes      Ultrasound used to identify targeted nerve, plexus, or vascular structure and placed a needle adjacent to it      Permanent Image entered into patiient's record      Abnormal pain on injection: No      Blood Aspirated: No      Paresthesias:  No    Bleeding at site: No      Bolus via:  Needle    Infusion Method:  Single Shot    Complications:  None  Assessment/Narrative:     Injection made incrementally with aspirations every (mL):  5

## 2017-12-01 NOTE — ANESTHESIA PREPROCEDURE EVALUATION
Anesthesia Evaluation     . Pt has had prior anesthetic. Type: General           ROS/MED HX    ENT/Pulmonary:     (+), . Other pulmonary disease Pulmonary [place].    Neurologic:  - neg neurologic ROS     Cardiovascular:     (+) Dyslipidemia, hypertension----. : . . . :. . Previous cardiac testing date:results:date: results:ECG reviewed date:11/13/2017 results:NSR= 77. PVCs, Incomplete RBBB date: results:          METS/Exercise Tolerance:     Hematologic:  - neg hematologic  ROS       Musculoskeletal:  - neg musculoskeletal ROS       GI/Hepatic:  - neg GI/hepatic ROS       Renal/Genitourinary:  - ROS Renal section negative       Endo: Comment: BMI= 34.72    (+) Obesity, .      Psychiatric:  - neg psychiatric ROS       Infectious Disease:  - neg infectious disease ROS       Malignancy:   (+) Malignancy History of Skin and Other  Skin CA Remission status post Surgery, Other CA Endometrial Cancer Active status post         Other:    (+) No chance of pregnancy C-spine cleared: N/A, no H/O Chronic Pain,no other significant disability                    Physical Exam      Airway   Mallampati: II  TM distance: >3 FB  Neck ROM: full    Dental     Cardiovascular   Rhythm and rate: regular and normal      Pulmonary    breath sounds clear to auscultation                    Anesthesia Plan      History & Physical Review  History and physical reviewed and following examination; no interval change.    ASA Status:  2 .        Plan for General, ETT and Periph. Nerve Block for postop pain with Intravenous and Propofol induction. Maintenance will be Balanced.    PONV prophylaxis:  Ondansetron (or other 5HT-3) and Dexamethasone or Solumedrol  Additional equipment: 2nd IV      Postoperative Care  Postoperative pain management:  IV analgesics, Multi-modal analgesia and Peripheral nerve block (Single Shot).      Consents  Anesthetic plan, risks, benefits and alternatives discussed with: .  Use of blood products discussed: No .   .                          .

## 2017-12-01 NOTE — IP AVS SNAPSHOT
Unit 6D Observation [address] [address]    Phone:  [phone]    Fax:  [phone]                                       After Visit Summary   12/1/2017    Shani Tinoco    MRN: [id number]           After Visit Summary Signature Page     I have received my discharge instructions, and my questions have been answered. I have discussed any challenges I see with this plan with the nurse or doctor.    ..........................................................................................................................................  Patient/Patient Representative Signature      ..........................................................................................................................................  Patient Representative Print Name and Relationship to Patient    ..................................................               ................................................  Date                                            Time    ..........................................................................................................................................  Reviewed by Signature/Title    ...................................................              ..............................................  Date                                                            Time

## 2017-12-01 NOTE — OP NOTE
Gynecologic Operative Note   Shani Tinoco  [id number]  12/1/2017     Preoperative Diagnosis: Stage 2 endometrial cancer  Postoperative Diagnosis: Same as above     Procedure:   1. Total laparoscopic hysterectomy with bilateral salpingo-oophorectomy  2. Lysis of adhesions  3. Pelvic and para-aortic lymph node dissection  4. Cystoscopy  5. Proctoscopy    Surgeon: Dr. Kt Degroot  Assistants:  1. Marcos Gutierrez MD, Fellow  2. Edvin Sanches MD, PGY-4  3. David Roy MD, PGY-2    Anesthesia: GETA with TAP block  Complications: None     EBL: 100 mL   IVF: 1500 mL crystalloid     Findings:   1. EUA/SSE: normal external genitalia, normal urethral orifice, mobile multiparous cervix with approximately 5 mm soft mass extruding from external os, mobile uterus, no adnexal masses appreciated  2. Laparoscopy: bowel adherent to left pelvic side wall and left ovary and fallopian tube. Left ovary adhesed within ovarian fossa. Adhesions taken down bluntly and sharply. Adhesions also noted between posterior aspect of lower uterus and posterior cul-de-sac. Normal appearing uterus, bilateral fallopian tubes and ovaries. Normal appearing liver edge, diaphragm, greater curvature of stomach and bowel on brief survey. Diverticular disease noted within colon.  3. Gr 2., 6 cm endometrioid adenocarcinoma of the uterus with ~ 50% myometrial invasion on frozen section  4. Cystoscopy: brisk efflux from bilateral ureteral orifices. Survey revealed no defects or foreign objects  5. Proctoscopy: no air bubbles visualized, reassuring for intact rectum and bowel. On rectal exam, no sutures or defects palpated.    Specimen: Pelvic washings; uterus, cervix, bilateral fallopian tubes and ovaries; right pelvic lymph nodes; left pelvic lymph nodes; para-aortic lymph nodes    Indications: Shani Tinoco is a 77 year old female who initially presented in 2014 with postmenopausal bleeding. Endometrial biopsy at that time notable for simple  hyperplasia, and she was treated with progestin for 3 months. Repeat endometrial biopsy was normal. She then presented again in 2017 with recurrent postmenopausal bleeding and endometrial biopsy at that time returned as grade 2 endometrial cancer. A total laparoscopic hysterectomy with bilateral salpingo-oophorectomy, possible open, sentinel and lymph node dissection and cancer staging was recommended at that time. All risks, benefits and alternatives were discussed and written informed consent was obtained.     Procedure:   After confirming informed consent, Ms. Shani Tinoco  was brought to the operating room where adequate general anesthesia was administered. She was placed in the dorsal lithotomy position, and exam under anesthesia revealed the findings noted above. She was prepped and draped in the usual sterile fashion, and pemberton catheter was placed. The umbilicus was everted, and a 5 mm stab incision was made. The Veress needle was placed, and intraperitoneal placement was suggested by the water drop test and an opening pressure of less than 5 mmHg.  The abdomen was then insufflated to a maximum pressure of 15 mmHg.  The Veress needle was removed and a 12 mm trocar was placed.  The laparoscope was placed, and survey of the abdomen revealed the findings noted above. No trauma from entry was noted. There was no overt evidence of metastatic disease.  Attention was turned to the right lower quadrant. At a point 2 cm superomedial to the ASIS, a 5 mm incision was made, and a 5 mm trocar was placed under direct visualization.  Attention was then turned to the left lower quadrant. Similarly, at a point 2 cm superomedial to the ASIS, a 12 mm incision was made, and a 12 mm trocar was placed under direct visualization.  The patient was placed in steep Trendelenburg position.    Attention was turned to the vagina. The cervix was visualized and serially dilated without difficulty.  A KOH ring and HARRIS uterine  manipulator were placed. The cervix was injected with Methylene blue dye.    At this point, the right ureter was identified transperitoneally, and noted to peristalse. Attention was then turned to the pelvis where the R round ligament was then grasped and transected using the Ligasure device. The posterior leaf of the broad ligament was then incised, the ureter clearly identified and a window created to isolate the infundibulopelvic ligament. The right infundibulopelvic ligament was then divided using the Ligasure device. The anterior leaf of the broad ligament was then incised along the bladder reflection to the midline.     At this point, attention was turned to the left lower quadrant. The left ureter was identified and noted to peristalse. Adhesions between bowel and left pelvic side wall, bowel and left ovary and left fallopian tube were carely taken down bluntly and sharply. Left ovary was noted to be adhesed within ovarian fossa. These adhesions were gently taken down in similar fashion to free the left ovary. Then, the left round ligament was transected using the Ligasure. In a similar fashion, the left infundibulopelvic ligament was then isolated, the ureter on the L identified and the IP divided using the Ligasure device. From this side, the anterior leaf of the broad ligament was incised along the bladder reflection to the midline. A bladder flap was mobilized and the peritoneum on the vesicouterine fold was incised to mobilize the bladder. The uterine arteries were then transected and ligated using the Ligasure device, down to the level of the colpotomy ring. The vagina was transected along the Koh cup using the monopolar Endoshears, resulting in separation of the uterus and attached tubes and ovaries. The uterus, tubes, and ovaries were then delivered through the vagina, and the pneumo-occluder balloon was reinserted to maintain pneumoperitoneum. The vaginal vault was closed with interrupted  figure-of-eight stitches of 0-Vicryl using the Endostitch device. The abdomen was irrigated, and excellent hemostasis was noted.     The pemberton catheter was then removed and the cystoscope was placed in the bladder. Efflux was noted from bilateral ureteral orifices. A complete survey of the bladder revealed no injury. Proctoscopy was also performed with no bubbles noted on irrigation of pelvis.    The frozen pathology came back 6 cm Gr. 2 endometrial adenocarcinoma with ~ 50% myometrial invasion, so staging procedure was initiated. We placed a 12 mm assistant port between the umbilical and left lower quadrant port under direct visualization with laparoscope. We then started with the right pelvic lymph node dissection. The borders of the pelvic lymph node dissection included cephalad bifurcation of the common iliac artery, caudad to the circumflex iliac vein, medially the superior vesicle artery and lateral to genitofemoral nerve. Lymph nodes overlying the iliac vessels towards the external iliac artery and vein, were elevated and resected and additional lymph nodes within the obturator space were removed above the level of the nerve without injury to the nerve. Attention was then turned towards the left. In a similar manner, the left pelvic lymph node dissection was performed with the borders extending from the common iliac artery to the deep circumflex iliac vein, medially the superior vesicle artery and laterally genitofemoral nerve. We were able to retract the ureter again medially out of harm's way. Lymph nodes overlying the iliac vessels were elevated and there was no significant adenopathy noted. In addition, we removed the lymph nodes within the obturator space above the level of the nerve without injury to the nerve.     We then performed the paraaortic lymph node dissection. The peritoneum overlying the common iliac, extending over the aorta to the level of the bifurcation of the inferior mesenteric artery  was incised with monopolar cautery. We reflected the peritoneum laterally over the aorta and vena cava and identified the ureter as its coursing and retracted this laterally out of the surgical site. Next, we performed right periaortic lymph node dissection extending from the bifurcation of the common iliac artery along the course of the aorta and lateral to the vena cava. The monopolar cautery was utilized for hemostasis as necessary. Next, we proceeded to perform the left periaortic lymph node dissection by initially incising the peritoneum extending it over the left common iliac. We identified the left ureter and retracted this lateral. The lymph nodes overlying the aorta and lateral to the vena cava were resected. Cautery and surgical clips were used for hemostasis. Both the right and left periaortic lymph nodes were out through the 12 mm port.     All beds were hemostatic. We placed surgicel into the paraarotic beds. A final look at the surgical sites showed excellent hemostasis. The 12 mm fascial incision was closed with an 0 Vicryl suture using the Vinicio-Sierra device.  All skin incisions were closed using 4-0 Monocryl, and covered with Dermabond and a sterile dressing.    All sponge, needle, and instrument counts were correct. The patient tolerated the procedure well and was transferred to recovery in stable condition. Dr. Degroot was present and scrubbed for the entire procedure.     David Roy MD 4:54 PM 12/1/2017

## 2017-12-02 VITALS
OXYGEN SATURATION: 95 % | SYSTOLIC BLOOD PRESSURE: 116 MMHG | WEIGHT: 177.91 LBS | DIASTOLIC BLOOD PRESSURE: 67 MMHG | HEIGHT: 60 IN | BODY MASS INDEX: 34.93 KG/M2 | TEMPERATURE: 97.9 F | RESPIRATION RATE: 16 BRPM

## 2017-12-02 LAB
ERYTHROCYTE [DISTWIDTH] IN BLOOD BY AUTOMATED COUNT: 13.5 % (ref 10–15)
HCT VFR BLD AUTO: 37.2 % (ref 35–47)
HGB BLD-MCNC: 11.9 G/DL (ref 11.7–15.7)
MCH RBC QN AUTO: 29.3 PG (ref 26.5–33)
MCHC RBC AUTO-ENTMCNC: 32 G/DL (ref 31.5–36.5)
MCV RBC AUTO: 92 FL (ref 78–100)
PLATELET # BLD AUTO: 182 10E9/L (ref 150–450)
RBC # BLD AUTO: 4.06 10E12/L (ref 3.8–5.2)
WBC # BLD AUTO: 8.9 10E9/L (ref 4–11)

## 2017-12-02 PROCEDURE — 36415 COLL VENOUS BLD VENIPUNCTURE: CPT | Performed by: STUDENT IN AN ORGANIZED HEALTH CARE EDUCATION/TRAINING PROGRAM

## 2017-12-02 PROCEDURE — A9270 NON-COVERED ITEM OR SERVICE: HCPCS | Mod: GY | Performed by: STUDENT IN AN ORGANIZED HEALTH CARE EDUCATION/TRAINING PROGRAM

## 2017-12-02 PROCEDURE — 25000132 ZZH RX MED GY IP 250 OP 250 PS 637: Mod: GY | Performed by: STUDENT IN AN ORGANIZED HEALTH CARE EDUCATION/TRAINING PROGRAM

## 2017-12-02 PROCEDURE — 85027 COMPLETE CBC AUTOMATED: CPT | Performed by: STUDENT IN AN ORGANIZED HEALTH CARE EDUCATION/TRAINING PROGRAM

## 2017-12-02 PROCEDURE — 99024 POSTOP FOLLOW-UP VISIT: CPT | Mod: GC | Performed by: OBSTETRICS & GYNECOLOGY

## 2017-12-02 PROCEDURE — G0378 HOSPITAL OBSERVATION PER HR: HCPCS

## 2017-12-02 RX ADMIN — OXYCODONE HYDROCHLORIDE 5 MG: 5 TABLET ORAL at 09:38

## 2017-12-02 RX ADMIN — RANITIDINE 150 MG: 150 TABLET ORAL at 09:38

## 2017-12-02 ASSESSMENT — PAIN DESCRIPTION - DESCRIPTORS: DESCRIPTORS: ACHING

## 2017-12-02 NOTE — PLAN OF CARE
Problem: Patient Care Overview  Goal: Plan of Care/Patient Progress Review  Outcome: No Change  Return to baseline mental status   -Hypercapnia, hypoventilation or hypoxia resolved for at least 2 hours without supplemental oxygen  : No requires 2 liters O2 while asleep  -Deficits in sensation, mobility or coordination have resolved if spinal or regional anesthesia was used:  No deficits noted    Nurse to notify MD when observation goals have been met and patient is ready for discharge.

## 2017-12-02 NOTE — PROGRESS NOTES
Arrived on 6d.  sats 95% on 3LNC.  C/o nausea and incisional pain.  zofran and tylenol given with relief.  Incisions dry, some shadowing, marked and intact.  No new drainage.  Oriented to room and unit routine.

## 2017-12-02 NOTE — OR NURSING
Pt still sleepy, requiring oxiplus mask to keep o2 sats above 93% .  Gyn/Onc resident to enter orders for observation.

## 2017-12-02 NOTE — PROGRESS NOTES
AOX4.VSS. Pt tolerating regular diet. Denies any n/v.Pt ambulated to bathroom. Pt Voids spontaneously. Abd incision CDI.  Pt. Complains of lower abd pain.Oxycodone given.

## 2017-12-02 NOTE — PLAN OF CARE
Problem: Patient Care Overview  Goal: Plan of Care/Patient Progress Review  Outcome: Improving  Pt ambulated to the bathroom with assist of 1. Voided in a large amount. Attempted O2 off with sats into the upper 80's. On 1 liter per NC. Denies pain.

## 2017-12-02 NOTE — DISCHARGE SUMMARY
Gynecologic Oncology Discharge Summary    Shani Tinoco  [id number]    Admit Date: 12/1/2017  Discharge Date: 12/2/2017  Admitting Provider: Jayde Ricardo MD  Discharge Provider: Jayde Ricardo MD    Admission Dx:   - Grade 2 endometrial adenocarcinoma  - HTN  - HLD    Discharge Dx:  - Grade 2 endometrial adenocarcinoma, at least stage 1B  - HTN  - HLD  - s/p below stated procedure    Patient Active Problem List   Diagnosis     S/P laparoscopic hysterectomy       Procedures: Total laparoscopic hysterectomy, bilateral salpingo-oophorectomy, pelvic and para-aortic lymph node dissection, lysis of adhesions, cystoscopy & proctoscopy    Prior to Admission Medications:  Prescriptions Prior to Admission   Medication Sig Dispense Refill Last Dose     cholecalciferol (VITAMIN D3) 1000 UNIT tablet Take 1,000 Units by mouth   11/30/2017 at 0800     losartan-hydrochlorothiazide (HYZAAR) 100-25 MG per tablet Take 1 tablet by mouth   11/30/2017 at 0800     rosuvastatin (CRESTOR) 40 MG tablet Take 40 mg by mouth   11/30/2017 at 1300     megestrol (MEGACE) 40 MG tablet Take 40 mg by mouth   11/30/2017 at 0800     Omega-3-acid Ethyl Esters (FISH OIL OMEGA-3 FATTY ACID) 320MG/ML oral suspension Take 1,000 mg by mouth   11/30/2017 at 2200     aspirin EC 81 MG EC tablet Take 81 mg by mouth daily    11/17/2017       Discharge Medications:   Shani Tinoco   Home Medication Instructions GAYLA:[id number]    Printed on:12/01/17 2112   Medication Information                      aspirin EC 81 MG EC tablet  Take 81 mg by mouth daily              cholecalciferol (VITAMIN D3) 1000 UNIT tablet  Take 1,000 Units by mouth             ibuprofen (ADVIL/MOTRIN) 600 MG tablet  Take 1 tablet (600 mg) by mouth every 6 hours as needed for pain (mild)             losartan-hydrochlorothiazide (HYZAAR) 100-25 MG per tablet  Take 1 tablet by mouth             megestrol (MEGACE) 40 MG tablet  Take 40 mg by mouth             Omega-3-acid Ethyl  Esters (FISH OIL OMEGA-3 FATTY ACID) 320MG/ML oral suspension  Take 1,000 mg by mouth             ondansetron (ZOFRAN-ODT) 4 MG ODT tab  Take 1-2 tablets (4-8 mg) by mouth every 8 hours as needed for nausea Dissolve ON the tongue.             oxyCODONE-acetaminophen (PERCOCET) 5-325 MG per tablet  Take 1-2 tablets by mouth every 4 hours as needed for pain (moderate to severe)             rosuvastatin (CRESTOR) 40 MG tablet  Take 40 mg by mouth             senna-docusate (SENOKOT-S;PERICOLACE) 8.6-50 MG per tablet  Take 1-2 tablets by mouth 2 times daily Take while on oral narcotics to prevent or treat constipation.                 Consultations:  None    Brief History of Illness:  Shani Tinoco is a 77 year old female who initially presented in 2014 with postmenopausal bleeding. Endometrial biopsy at that time notable for simple hyperplasia, and she was treated with progestin for 3 months. Repeat endometrial biopsy was normal. She then presented again in 2017 with recurrent postmenopausal bleeding and endometrial biopsy at that time returned as grade 2 endometrial cancer. A total laparoscopic hysterectomy with bilateral salpingo-oophorectomy, possible open, sentinel and lymph node dissection and cancer staging was recommended at that time. All risks, benefits and alternatives were discussed and written informed consent was obtained. On POD#0 she was unable to wean from O2 in the PACU and thus was admitted to observation.     Hospital Course:  Dz:   - Preoperative diagnosis was  grade 2 endometrial adenocarcinoma.  Frozen section at the time of the surgery showed greater than 50% of myometrium invasion and this staging was performed. Final pathology is pending at the time of discharge.  She will follow-up postoperatively for a care plan.  FEN:   - She was maintained on IVF until POD#1, when her diet was slowly advanced.  By discharge, she was tolerating a regular diet without nausea and vomiting and able to  maintain her hydration without IVF supplementation.  Pain:   - Her pain was initially controlled on PO pain meds with IV narcotics available for breakthrough pain.  Her pain was well controlled on this and she was discharged home on PO pain medications.  CV:   - She has a history of HTN and HLD. Her home losartan and statin were held while she was inpatient and restarted on discharge.  Her vital signs were stable while in house and she had no acute CV issues.  PULM:   - She has no history of pulmonary issues.  She was initially given O2 supplementation in to maintain her O2 sats in the immediate postop period and was transitioned off of this without difficulty.  By discharge, her O2 sats were greater than 94% on RA.  She was encouraged to use her bedside IS while in house.  She had no acute pulmonary issues while in house. She will have a home sleep study, and oxygen monitoring by home health care after discharge.   HEME:   - Her preoperative Hgb was 14.7.  Her hgb dropped to 11.9 postoperatively and was stable at the time of discharge.  She had no other acute heme issues while in house.  GI:   - She was made NPO prior to the procedure.  On POD#0, her diet was advanced to clear liquids and then advanced slowly as tolerated.  At the time of discharge, she was tolerating a regular diet without nausea and vomiting.  She will be discharged with a bowel regimen to prevent constipation in the postoperative period.  She had no acute GI issues while in house.  :    -  A pemberton catheter was placed at the time of the surgery and was removed immediately after the surgery.  Prior to discharge, the patient was voiding spontaneously without difficulty.  She had no acute  issues while in house.  ID:   - The patient was AF during her hospitalization.  She received standard preoperative antibiotics without incident.    ENDO:   - no issues  PSYCH/NEURO:   - no issues  PPX:    - She was given SCDs, IS, PPI during her hospital  course.  She tolerated these prophylactic interventions without incident. They were discontinued at the time of her discharge.      Discharge Instructions and Follow up:  Ms. Shani Tinoco was discharged from the hospital with follow up for post-op visit and discussion regarding further treatment.    Discharge Diet: Regular  Discharge Activity: No lifting, driving, or strenuous exercise for 6 week(s)  Pelvic rest: abstain from intercourse and do not use tampons for 6 week(s)  Discharge Follow up: Post-op appointment with Dr. Degroot at 12/18 at 3pm    Discharge Disposition:  Discharged to home    Discharge Staff: MD Mariama Brunson MD, MHS  Gyn Onc PGY2  12/2/2017     Jayde Ricardo MD  Gynecologic Oncology  [facility]

## 2017-12-02 NOTE — PLAN OF CARE
Problem: Patient Care Overview  Goal: Plan of Care/Patient Progress Review  Hypercapnia, hypoventilation or hypoxia resolved for at least 2 hours without supplemental oxygen  : No requires 2 liters O2 while asleep  -Deficits in sensation, mobility or coordination have resolved if spinal or regional anesthesia was used:  No deficits noted    Nurse to notify MD when observation goals have been met and patient is ready for discharge

## 2017-12-02 NOTE — PROGRESS NOTES
Discharge instructions reviewed, understood, and signed by patient. VSS, PIV removed, new medications reviewed and understood, patient has all belongings. Patient ambulated to main lobby with family.

## 2017-12-02 NOTE — ANESTHESIA POSTPROCEDURE EVALUATION
Patient: Shani Tinoco    Procedure(s):  Laparoscopic Total Hysterectomy, Bilateral Salpingo-Oophorectomy, Lymph Node Dissection, Anesthesia Block - Wound Class: II-Clean Contaminated   - Wound Class: II-Clean Contaminated   - Wound Class: II-Clean Contaminated   - Wound Class: II-Clean Contaminated    Diagnosis:Endometrial Cancer   Diagnosis Additional Information: No value filed.    Anesthesia Type:  General, ETT, Periph. Nerve Block for postop pain    Note:  Anesthesia Post Evaluation    Patient location during evaluation: PACU  Patient participation: Able to fully participate in evaluation  Level of consciousness: awake and alert  Pain management: adequate  Airway patency: patent  Cardiovascular status: hemodynamically stable  Respiratory status: acceptable  Hydration status: acceptable  PONV: none             Last vitals:  Vitals:    12/01/17 1815 12/01/17 1830 12/01/17 1845   BP: 130/81 135/78 128/78   Resp: 22 19    Temp:      SpO2: 92% 92% 94%         Electronically Signed By: Debora Davis MD  December 1, 2017  8:30 PM

## 2017-12-02 NOTE — H&P
[facility] History and Physical    Shani Tinoco MRN# [id number]   Age: 77 year old YOB: 1940     Date of Admission:  12/1/2017    Primary care provider: Daniella Rodrigues             Chief Complaint:   O2 requirement sp laparoscopy         History of Present Illness:   This patient is a 77 year old female with grade 2, at least stage 1B endometrial adenocarcinoma POD#0 s/p total laparoscopic hysterectomy, bilateral salpingo-oophorectomy, pelvic and para-aortic lymph node dissection, lysis of adhesions, cystoscopy & proctoscopy. She was unable to wean from O2 in the PACU and thus was admitted to observation.  She feels well overall, a little bit of abdominal discomfort. Pain well controlled so far. Tolerating water without nausea or vomiting, not hungry at all though. Voiding spontaneously. Denies SOB, chest pain or palpitations.            Cancer Treatment History:     8/8/2014: First presentation for postmenopausal bleeding.  - Pelvic US Impression: Enlarged uterus with thickened endometrium, EMS 5.57 cm. No evidence of focal mass or the like within the endometrial cavity. Right ovary not visualized. Left without abnormality.  - EMBx notable for simple hyperplasia.   - Underwent progestin therapy x 3 months. Repeat EMBx without hyperplasia.     10/10/17: Presentation to PCP for recurrent episode of postmenopausal bleeding. Subsequent endometrial biopsy, with results of:  - Cervical biopsy: Squamous and columnar mucosa with mild chronic inflammation. No squamous intraepithelial lesion identified  - Endometrial biopsy: Endometrioid adenocarcinoma, FIGO Grade 2.    10/31/17: [facility] Pathology Consult. CASE FROM [place] ([id number], OBTAINED 10/10/17):   A. CERVIX, BIOPSY:   - Squamous and columnar mucosa with mild chronic inflammation   - No squamous intraepithelial lesion identified     B. 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  Carpal tunnel  Excision of lung hamartoma  Urethral sling  TLH, BSO, PPALND, cysto , procto         Social History:     Social History   Substance Use Topics     Smoking status: Never Smoker     Smokeless tobacco: Never Used     Alcohol use No            Family History:     Family History   Problem Relation Age of Onset     Breast Cancer Mother      Breast Cancer Sister      Breast Cancer Maternal Aunt      Breast Cancer Maternal Aunt      Breast Cancer Maternal Aunt      Breast Cancer Daughter      Cervical Cancer Daughter           Allergies:     Allergies   Allergen Reactions     Norco [Hydrocodone-Acetaminophen] Nausea and Vomiting          Medications:     Current Facility-Administered Medications   Medication     bupivacaine liposome (EXPAREL) LONG ACTING injection was administered into the infiltration site to produce postsurgical analgesia. Duration of action is up to 72 hours, and other \"swapna\" medications should not be given for 96 hours with the exception of the lidocaine 5% patch (LIDODERM) and the lidocaine 10mg in potassium infusions. This entry is for INFORMATION ONLY.     NO Rho (D) immune globulin (RhoGam) needed - NOT obstetric patient     ibuprofen (ADVIL/MOTRIN) tablet 600 mg     oxyCODONE IR (ROXICODONE) tablet 5 mg     sodium chloride (PF) 0.9% PF flush 3 mL     sodium chloride (PF) 0.9% PF flush 3 mL     sodium chloride (PF) 0.9% PF flush 3 mL     naloxone (NARCAN) injection 0.1-0.4 mg     0.9% sodium chloride infusion     HYDROmorphone (DILAUDID) injection 0.2 mg     oxyCODONE IR (ROXICODONE) tablet 5 mg     ibuprofen (ADVIL/MOTRIN) tablet 600 mg "     acetaminophen (TYLENOL) tablet 650 mg     ondansetron (ZOFRAN-ODT) ODT tab 4 mg    Or     ondansetron (ZOFRAN) injection 4 mg     prochlorperazine (COMPAZINE) injection 5 mg    Or     prochlorperazine (COMPAZINE) tablet 5 mg     metoclopramide (REGLAN) tablet 5 mg    Or     metoclopramide (REGLAN) injection 5 mg     ranitidine (ZANTAC) tablet 150 mg    Or     famotidine (PEPCID) injection 20 mg     senna-docusate (SENOKOT-S;PERICOLACE) 8.6-50 MG per tablet 1-2 tablet     simethicone (MYLICON) chewable tablet 80 mg          Review of Systems:   10 point ROS negative except as above in HPI         Physical Exam:     Vitals:    12/01/17 2015 12/01/17 2030 12/01/17 2045 12/01/17 2145   BP: 131/82 (!) 123/92 (!) 119/91 125/76   Resp:       Temp:    98.1  F (36.7  C)   TempSrc:       SpO2: 95% 94% 95% 95%   Weight:       Height:         General: lying in bed, no acute distress  CV: RRR, no m/r/g  Resp: CTAB throughout posterior lung fields  Abdomen: soft, appropriately tender, moderate distention. Port sites covered with small gauze and no shadowing  Extremities: Warm ,well perfused, SCDs in place, calves nontender. 1+ edema b/l lE         Data:     Results for orders placed or performed during the hospital encounter of 12/01/17 (from the past 24 hour(s))   Surgical pathology exam   Result Value Ref Range    Copath Report       Patient Name: SHYANNE MCCARTNEY  MR#: [id number]  Specimen #: [id number]  Reported: 12/1/2017 15:23  Ordering Phy(s): TONY ELIZABETH    *PRELIMINARY*  INTRAOPERATIVE DIAGNOSIS  PRELIMINARY INTRAOPERATIVE FROZEN SECTION DIAGNOSIS:  AFS1-AFS3: Cervix, uterus, bilateral fallopian tubes and ovaries:  - Endometrioid adenocarcinoma, FIGO grade 2  - Mass size: 6 cm  - Myometrial invasion present, about 50%  - Ectocervix with prominent Nabothian cysts    ELECTRONICALLY SIGNED OUT BY:  Reyna Wharton M.D., [facility]    Status:  Signed Out  Date Ordered: 12/1/2017 14:07  Date Reported:  12/1/2017 15:23       Glucose by meter   Result Value Ref Range    Glucose 121 (H) 70 - 99 mg/dL   Creatinine   Result Value Ref Range    Creatinine 0.88 0.52 - 1.04 mg/dL    GFR Estimate 62 >60 mL/min/1.7m2    GFR Estimate If Black 75 >60 mL/min/1.7m2   Potassium   Result Value Ref Range    Potassium 3.4 3.4 - 5.3 mmol/L   ABO/Rh type and screen   Result Value Ref Range    ABO O     RH(D) Neg     Antibody Screen Neg     Test Valid Only At          [facility]    Specimen Expires 12/04/2017              Assessment and Plan:   Assessment: 77 year old female with grade 2, at least stage 1B endometrial adenocarcinoma POD#0 s/p total laparoscopic hysterectomy, bilateral salpingo-oophorectomy, pelvic and para-aortic lymph node dissection, lysis of adhesions, cystoscopy & proctoscopy. She was unable to wean from O2 in the PACU and thus was admitted to observation.     Plan:  Dz: Grade 2, at least stage 1B endometrial adenocarcinoma  FEN: Regular diet, IVF 100cc/hr  Pain: Tylenol, ibuprofen, oxycodone, IV dilaudid PRN  Heme: Hgb 14.7 >  > will recheck in AM  CV: HTN, HLD. Home losartan & statin held  Pulm: NI, wean O2  GI: NI, bowel regimen  : Jalloh removed immediately post-op, voiding spont  ID: NI  Endocrine: NI  Psych/Neuro: NI  PPX: PPI, SCDs, IS  Dispo:  Pending meeting goals, O2 weaned, tolerating PO intake - likely POD#1    Tahira Rahman MD  OB/GYN Resident-PGY2  12/1/2017 10:44 PM

## 2017-12-02 NOTE — PROGRESS NOTES
Gynecology Oncology Progress Note    Ms. Shani Tinoco is a 77 year old POD#1 s/p TLH, BSO, PPALND, PAULO, cysto, procto.    Dz: Grade 2, at least stage 1B endometrial adenocarcinoma    24 hour events:   - s/p above stated procedure  - Admit to observation as unable to wean O2 in PACU  - Desaturation to 87% while sleeping, >95% on RA when awake    Subjective: Patient is feeling well.  Pain is well controlled though is still a little sore.  Drinking lots of water, hasn't eaten yet. Denies nausa just not hungry.  No flatus. Voiding spontaneously.     Objective:   Patient Vitals for the past 12 hrs:   BP Temp Heart Rate Resp SpO2   12/02/17 0445 - - - - (!) 87 %   12/02/17 0430 130/81 98.2  F (36.8  C) 71 16 -   12/02/17 0015 114/62 97.7  F (36.5  C) 73 - 94 %   12/02/17 0000 115/65 - - - 94 %   12/01/17 2245 121/69 - - - 94 %   12/01/17 2230 127/78 - - - 93 %   12/01/17 2145 125/76 98.1  F (36.7  C) 76 - 95 %   12/01/17 2045 (!) 119/91 - 67 - 95 %   12/01/17 2030 (!) 123/92 - 71 - 94 %   12/01/17 2015 131/82 - 82 - 95 %   12/01/17 2000 142/89 - 80 - 95 %   12/01/17 1945 127/77 - 71 - 96 %   12/01/17 1930 130/76 - 71 - 93 %   12/01/17 1915 126/84 - 69 - 93 %   12/01/17 1900 133/83 - 75 - 93 %   12/01/17 1845 128/78 - 69 - 94 %   12/01/17 1830 135/78 - 72 19 92 %   12/01/17 1815 130/81 - 75 22 92 %     General: overweight female, in NAD  CV: RRR, no m/r/g  Resp: CTAB  Abdomen: soft, minimally tender, not distended  Port sites: bandage without shadowing  Extremities: nontender, trace edema, SCDs in place    I/Os (24 hrs in mL // since MN in mL)  PO: none reported // none charted - drank 2-3 cups of water  IV: 1598 // 600  Urine: 700 since admit // 600    UOP 100cc/hr    New labs/imaging-  Hgb 14.7 > AM pending    Assessment: Ms. Tinoco is a 77 year old female with grade 2, at least stage 1B endometrial adenocarcinoma POD#1 s/p total laparoscopic hysterectomy, bilateral salpingo-oophorectomy, pelvic and para-aortic  lymph node dissection, lysis of adhesions, cystoscopy & proctoscopy. She was unable to wean from O2 in the PACU and thus was admitted to observation.    Active Problem list:  - Grade 2 endometrial adenocarcinoma, at least stage 1B  - HTN  - HLD  - Postoperative state    Plan:    Dz: Grade 2, at least stage 1B endometrial adenocarcinoma  FEN: Regular diet, Stop mIVF  Pain: Tylenol, ibuprofen, oxycodone, IV dilaudid PRN  Heme: VSS, no concern for active bleeding.   CV: HTN, HLD. Home losartan & statin held  Pulm: NI, wean O2  GI: NI, bowel regimen  : NI  ID: NI  Endocrine: NI  Psych/Neuro: NI  PPX: PPI, SCDs, IS  Dispo:  Pending meeting goals, O2 weaned - likely today    Tahira Rahman MD   Gyn Onc PGY2  Pager [phone]    I, Sky Ricardo personally examined and evaluated this patient on 12/02/17.  I discussed the patient with the resident and care team, and agree with the assessment and plan of care as documented in the residents note above.    I personally reviewed vital signs, laboratory values and imaging results.    Pt admitted post-op due to O2 requirements.  Likely MULUGETA.  Will assess for home O2 needs and arrange outpatient sleep study.  Home today    Jayde Ricardo MD  Gynecologic Oncology  [facility]

## 2017-12-04 NOTE — PROGRESS NOTES
Home care orders were sent to [facility] and due to location we will not be providing service.     Thank you  Emelina Vallejo RN, BSN  [facility] Liaison  [phone]

## 2017-12-05 ENCOUNTER — CARE COORDINATION (OUTPATIENT)
Dept: ONCOLOGY | Facility: CLINIC | Age: 77
End: 2017-12-05

## 2017-12-05 LAB — COPATH REPORT: NORMAL

## 2017-12-06 ENCOUNTER — CARE COORDINATION (OUTPATIENT)
Dept: ONCOLOGY | Facility: CLINIC | Age: 77
End: 2017-12-06

## 2017-12-06 DIAGNOSIS — R91.1 LUNG NODULE: Primary | ICD-10-CM

## 2017-12-07 NOTE — PROGRESS NOTES
Per verbal orders of Dr. Ronit Pantoja, injection of flu shot given by Sonal Mace LPN. Patient instructed to remain in clinic for 20 minutes afterwards, and to report any adverse reaction to me immediately. Vaccine documentation completed. Tolerated well. Consent signed. Surgery 12/1/17  Post op visit 12/18/17  3pm    Spoke with granddaughter Itzel  She states all is good with patient  Pt is eating and drinking well, denies any complaints, no fever, urinary issues, leg redness,tenderness, swelling  Incision looks healthy  Using oxycodone bid for pain, encouraged to try tylenol and/or ibuprofen during the day for discomfort  Itzel does not know if pt has had BM yet since surgery but she assumes so as pt has not complained, encouraged to clarify this with pt and let us know if no BM since surgery  Itzel reports understanding and will call if any issues

## 2017-12-11 LAB — COPATH REPORT: NORMAL

## 2017-12-17 NOTE — PROGRESS NOTES
Consult Notes on Referred Patient    Dr. Richardson [facility]  [address] [place] [address]       RE: Shani Campa Earnest  : 1940  ANYA:  2017     Dear Dr. Richardson [facility]:    I had the pleasure of seeing your patient Shani Tinoco here at the Gynecologic Cancer Clinic at the [facility] on 2017.  As you know, she is a very pleasant 77 year old woman with a recent diagnosis of endometrioid adenocarcinoma of the uterus.  Given these findings, she was subsequently sent to the Gynecologic Cancer Clinic for a new patient consultation.     Today, she reports that she is feeling well. She notes improvement in her vaginal bleeding since starting on oral progesterone as prescribed by her PCP. She denies any other symptoms, including fevers/chills, abnormal weight gain or weight los, nausea/vomiting, chest pain, shortness of breath, dysuria, change in bowel habit, or other systemic symptoms.    Cancer History:  2014: First presentation for postmenopausal bleeding.  - Pelvic US Impression: Enlarged uterus with thickened endometrium, EMS 5.57 cm. No evidence of focal mass or the like within the endometrial cavity. Right ovary not visualized. Left without abnormality.  - EMBx notable for simple hyperplasia.   - Underwent progestin therapy x 3 months. Repeat EMBx without hyperplasia.    10/10/17: Presentation to PCP for recurrent episode of postmenopausal bleeding. Subsequent endometrial biopsy, with results of:  - Cervical biopsy: Squamous and columnar mucosa with mild chronic inflammation. No squamous intraepithelial lesion identified  - Endometrial biopsy: Endometrioid adenocarcinoma, FIGO Grade 2.    10/31/17: [facility]ology Consult. CASE FROM [place], [place] ([id number], OBTAINED 10/10/17):   A. CERVIX, BIOPSY:   - Squamous and columnar mucosa with mild chronic inflammation   - No squamous intraepithelial lesion identified     B. ENDOMETRIUM, BIOPSY:   -  Endometrioid adenocarcinoma, FIGO grade 2    12/1/2017: [facility]/BSO/Staing:  PATH: A. 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 LYMPH NODE, RIGHT PELVIC, EXCISION:   - Five reactive lymph nodes, negative for malignancy (0/5)     C. LYMPH NODE, LEFT PELVIC, EXCISION:   - Four reactive lymph nodes, negative for malignancy (0/4)     D. LYMPH NODE, PARA-AORTIC, EXCISION:   - Thirteen reactive lymph nodes, negative for malignancy (0/13)     COMMENT:   On gross examination there was a 6 cm mass in the endometrial cavity.   Microscopically the mass proved to be endometrioid adenocarcinoma   arising in endometrial polyp with progesterone effects. The transected   tumor measures at least 2 cm on a slide (FSA2).     The left ovary is 3.9 cm. Microscopic examination show lobular growth of   spindle cells involving the ovarian cortex and expanding into the   medulla. There is no evidence of necrosis or significant atypia. Mitotic   figures are easily identifiable. The clusters of the lutein cells are   seen.     The immunostains are performed on block A15. The tumor cells are   diffusely positive for inhibin and show focal expression of calretinin   while negative for cytokeratins (AE1/AE3, CK8/18) and chromogranin. Few   desmin positive cells are located outside of the tumor nodules. 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     The MMR enzymes are intact (see biopsy  [id number]). The final diagnosis   confirms the interpretation provided intraoperatively.     Report Name: Endometrium - Hysterectomy        Status: Submitted Checklist Inst: 1      Last Updated By: Lester Mcghee, 12/08/2017 15:52:14   Part(s) Involved:   A: Cervix, uterus, bilateral fallopian tubes and ovaries     Synoptic Report:     SPECIMEN     Specimen:         - Uterine corpus     CLINICAL     Clinical History:         - Uterine bleeding     SPECIMEN     Procedure       Procedure:           - Simple hysterectomy       Additional Procedures:           - Bilateral salpingo-oophorectomy           - Peritoneal washing     Specimen Integrity:         - Intact hysterectomy specimen     Lymph Node Sampling:         - Performed       Node Location:           - Pelvic lymph nodes           - Para-aortic lymph nodes     TUMOR     Primary Tumor Site:         - Anterior endometrium         - Posterior endometrium     Histologic Type:         - Endometrioid adenocarcinoma       Comment: none     Histologic Grade:         - FIGO grade 2     Tumor Size: 2 x 1.4 cm     Tumor Extent       Myometrial Invasion:           - Present         Depth of Myometrial Invasion: 14 mm         Myometerial Thickness (mm): 21 mm         Serosal Extension:             - Not identified       Tumor Involvement of Cervix:           - Not involved       Extent of Involvement of Other Organs         Right Ovary:             - Not involved         Left Ovary:             - Not involved         Right Fallopian Tube:             - Not involved         Left Fallopian Tube:             - Not involved       Peritoneal Ascitic Fluid:           - Negative for malignancy (normal / benign)     Accessory Tumor Findings       Lymph-Vascular Invasion:           - Not identified     MARGINS     Margins:         - Uninvolved by invasive carcinoma     LYMPH NODES     Regional Lymph Nodes:         - Pelvic         - Para-aortic       Number of Pelvic Lymph Nodes  Examined: 9       Number of Para-Aortic Lymph Nodes Examined: 13     Lymph Node Involvement:         - None identified     STAGE (PTNM)     Primary Tumor (pT):         - pT1b: Tumor invades greater than or equal to one-half of the   myometrium     Regional Lymph Nodes (pN)       Category (pN):           - pN0: No regional lymph node metastasis     FIGO STAGE     FIGO Stage:         - IB: Invasion equal to or more than half of the myometrium       Review of Systems:  Systemic           no weight changes; no fever; no chills; no night sweats; no appetite changes  Skin           no rashes, or lesions  Eye           no irritation; no changes in vision  Lisa-Laryngeal           no dysphagia; no hoarseness   Pulmonary    no cough; no shortness of breath  Cardiovascular    no chest pain; no palpitations  Gastrointestinal    no diarrhea; no constipation; no abdominal pain; no changes in bowel  habits; no blood in stool  Genitourinary   no urinary frequency; no urinary urgency; no dysuria; no pain; no abnormal vaginal discharge; no abnormal vaginal bleeding  Breast    no breast discharge; no breast changes; no breast pain  Musculoskeletal    no myalgias; no arthralgias; no back pain  Psychiatric           no depressed mood; no anxiety    Hematologic           no tender lymph nodes; no noticeable swellings or lumps   Endocrine    no hot flashes; no heat/cold intolerance         Neurological   no tremor; no numbness and tingling; no headaches; no difficulty  sleeping      Past Medical History:  Past Medical History:   Diagnosis Date     Basal cell carcinoma      Cataract      Cholelithiasis      Endometrial cancer (H)      Essential hypertension      Hyperlipidemia      Nonexudative senile macular degeneration of retina      Osteopenia        Past Surgical History:  Past Surgical History:   Procedure Laterality Date     CYSTOSCOPY N/A 12/1/2017    Procedure: CYSTOSCOPY;;  Surgeon: Kt Degroot MD;  Location:  OR      HC EXCISION BREAST LESION, OPEN >=1      sclerosing papilloma, excised right breast     LAPAROSCOPIC HYSTERECTOMY TOTAL, BILATERAL SALPINGO-OOPHORECTOMY, COMBINED N/A 2017    Procedure: COMBINED LAPAROSCOPIC HYSTERECTOMY TOTAL, SALPINGO-OOPHORECTOMY;  Laparoscopic Total Hysterectomy, Bilateral Salpingo-Oophorectomy, Lymph Node Dissection, Anesthesia Block;  Surgeon: Kt Degroot MD;  Location: [facility] OR     PROCTOSCOPY N/A 2017    Procedure: PROCTOSCOPY;;  Surgeon: Kt Degroot MD;  Location: [facility] OR     RELEASE CARPAL TUNNEL       Unlisted proc Lungs & Pleura      Excision of hamartoma of lung     urethral sling         Health Maintenance:  Health Maintenance Due   Topic Date Due     TETANUS IMMUNIZATION (SYSTEM ASSIGNED)  1956     LIPID SCREEN Q5 YR FEMALE (SYSTEM ASSIGNED)  1983     ADVANCE DIRECTIVE PLANNING Q5 YRS  1993     FALL RISK ASSESSMENT  2003     PNEUMOCOCCAL (1 of 2 - PCV13) 2003     INFLUENZA VACCINE (SYSTEM ASSIGNED)  2017       Pap Smear:  Can not recall her last Pap Smear, but denies any history of abnormal Pap smears.    Last Mammogram: 12/21/15              Result: normal      She has not had a history of abnormal mammograms.    Last Colonoscopy: Can not recall her last Pap Smear, but denies any history                    Last DEXA Scan: 12/21/15              Result: normal    Last Diabetes Screening; 17    Last Thyroid Screening:      Not identified    Last Cholest Screenin/27/17      Current Medications:   Megestrol - 40 mg once daily  Hyzaar- 100-25 mg tablet daily  Norvasc- 5 mg tablet daily  Crestor- 40 mg tablet daily  Low dose aspirin - 81 mg daily      Allergies:   Allergies   Allergen Reactions     Norco [Hydrocodone-Acetaminophen] Nausea and Vomiting         Social History:   Social History   Substance Use Topics     Smoking status: Never Smoker     Smokeless tobacco: Never Used     Alcohol use No  "      History   Drug Use No       Family History:   The patient's family history is notable for:  Family History   Problem Relation Age of Onset     Breast Cancer Mother      Breast Cancer Sister      Breast Cancer Maternal Aunt      Breast Cancer Maternal Aunt      Breast Cancer Maternal Aunt      Breast Cancer Daughter      Cervical Cancer Daughter      Denies family history of colon, uterine, or ovarian cancer    Physical Exam:   PS: 0  VS: /71  Pulse 80  Temp 98.8  F (37.1  C) (Oral)  Resp 18  Ht 1.52 m (4' 11.84\")  Wt 79.8 kg (176 lb)  SpO2 95%  BMI 34.55 kg/m2      General Appearance: healthy and alert, no distress     HEENT:  no thyromegaly, no palpable nodules or masses        Cardiovascular: regular rate and rhythm, no gallops, rubs or murmurs     Respiratory: lungs clear, no rales, rhonchi or wheezes, normal diaphragmatic excursion    Musculoskeletal: extremities non tender and without edema    Skin: no lesions or rashes     Neurological: normal gait, no gross defects     Psychiatric: appropriate mood and affect                               Hematological: normal cervical, supraclavicular and inguinal lymph nodes     Gastrointestinal:       abdomen soft, non-tender, non-distended, no organomegaly or masses    Genitourinary: deferred      Assessment:    Shani Tinoco is a 77 year old woman with a new diagnosis of Staged IB G2 endometrioid adenocarcinoma of the uterus without LVSI .     A total of 30 minutes was spent with the patient, 30 minutes of which were spent in counseling the patient and/or treatment planning.    Plan:     1.)    Uterine adenocarcinoma. WE have discussed the findings and options. The patient understands that she is at an elevated risk of recurrence per  criteria (G3, deep invasion, age69) and as such we have discussed the options of observation (given a lower than expected recurrence rate in ), vag cuff RT with or without chemotherapy, and whole pelvic " RT which was the standard of care in .  The patient understands the complexities of this condition and after a comprehensive discussed she is inclined to likely pelvic RT but will defer final decision to meeting with the Radiation Oncology Staff    Given that she still has an impending bronchoscopy and biopsy to attend I have recommended that we try to co-ordinate those visits if possible.  She is amenable to this, but would like to receive RT in [place] if possible     2.) Genetic risk factors were assessed and the patient does meet the qualifications for a referral.    3.) Labs and/or tests ordered include:  none     4.) Health maintenance issues addressed today include: recommendations for follow up colonoscopy and mammogram.       Kt Degroot        CC  Patient Care Team

## 2017-12-18 ENCOUNTER — ONCOLOGY VISIT (OUTPATIENT)
Dept: ONCOLOGY | Facility: CLINIC | Age: 77
End: 2017-12-18
Attending: OBSTETRICS & GYNECOLOGY
Payer: MEDICARE

## 2017-12-18 VITALS
DIASTOLIC BLOOD PRESSURE: 71 MMHG | OXYGEN SATURATION: 95 % | WEIGHT: 176 LBS | RESPIRATION RATE: 18 BRPM | SYSTOLIC BLOOD PRESSURE: 125 MMHG | BODY MASS INDEX: 34.55 KG/M2 | TEMPERATURE: 98.8 F | HEART RATE: 80 BPM | HEIGHT: 60 IN

## 2017-12-18 DIAGNOSIS — C54.1 ENDOMETRIAL CANCER (H): Primary | ICD-10-CM

## 2017-12-18 DIAGNOSIS — R91.8 PULMONARY NODULES: ICD-10-CM

## 2017-12-18 PROCEDURE — 99212 OFFICE O/P EST SF 10 MIN: CPT | Mod: ZF

## 2017-12-18 PROCEDURE — 99214 OFFICE O/P EST MOD 30 MIN: CPT | Mod: ZP | Performed by: OBSTETRICS & GYNECOLOGY

## 2017-12-18 ASSESSMENT — PAIN SCALES - GENERAL: PAINLEVEL: MODERATE PAIN (4)

## 2017-12-18 NOTE — MR AVS SNAPSHOT
"              After Visit Summary   2017    Shani Tinoco    MRN: [id number]           Patient Information     Date Of Birth          1940        Visit Information        Provider Department      2017 3:00 PM Kt Degroot MD [facility]        Today's Diagnoses     Endometrial cancer (H)    -  1    Pulmonary nodules           Follow-ups after your visit        Who to contact     If you have questions or need follow up information about today's clinic visit or your schedule please contact [facility] directly at [phone].  Normal or non-critical lab and imaging results will be communicated to you by [employer], letter or phone within 4 business days after the clinic has received the results. If you do not hear from us within 7 days, please contact the clinic through [employer] or phone. If you have a critical or abnormal lab result, we will notify you by phone as soon as possible.  Submit refill requests through [employer] or call your pharmacy and they will forward the refill request to us. Please allow 3 business days for your refill to be completed.          Additional Information About Your Visit        MyChart Information     [employer] lets you send messages to your doctor, view your test results, renew your prescriptions, schedule appointments and more. To sign up, go to [email or web address] . Click on \"Log in\" on the left side of the screen, which will take you to the Welcome page. Then click on \"Sign up Now\" on the right side of the page.     You will be asked to enter the access code listed below, as well as some personal information. Please follow the directions to create your username and password.     Your access code is: [email or web address]  Expires: 2018  7:45 PM     Your access code will  in 90 days. If you need help or a new code, please call your [place] clinic or [phone].        Care EveryWhere ID     This is your Care " "EveryWhere ID. This could be used by other organizations to access your [place] medical records  [id number]        Your Vitals Were     Pulse Temperature Respirations Height Pulse Oximetry BMI (Body Mass Index)    80 98.8  F (37.1  C) (Oral) 18 1.52 m (4' 11.84\") 95% 34.55 kg/m2       Blood Pressure from Last 3 Encounters:   12/18/17 125/71   12/02/17 116/67   11/28/17 155/90    Weight from Last 3 Encounters:   12/18/17 79.8 kg (176 lb)   12/01/17 80.7 kg (177 lb 14.6 oz)   11/28/17 81.8 kg (180 lb 6.4 oz)              Today, you had the following     No orders found for display       Primary Care Provider Office Phone # Fax #    Daniella Rodrigues [phone]       [place] [address]        Equal Access to Services     [facility]: Hadii aad ku hadasho Soomaali, waaxda luqadaha, qaybta kaalmada adeegyada, waxay bintain hayheathern narcisa pugh . So [facility] [phone].    ATENCIÓN: Si yoshila español, tiene a brumfield disposición servicios gratuitos de asistencia lingüística. Llame al [phone].    We comply with applicable federal civil rights laws and [place] laws. We do not discriminate on the basis of race, color, national origin, age, disability, sex, sexual orientation, or gender identity.            Thank you!     Thank you for choosing [facility]  for your care. Our goal is always to provide you with excellent care. Hearing back from our patients is one way we can continue to improve our services. Please take a few minutes to complete the written survey that you may receive in the mail after your visit with us. Thank you!             Your Updated Medication List - Protect others around you: Learn how to safely use, store and throw away your medicines at www.disposemymeds.org.          This list is accurate as of: 12/18/17  4:28 PM.  Always use your most recent med list.                   Brand Name Dispense Instructions for use " Diagnosis    aspirin EC 81 MG EC tablet      Take 81 mg by mouth daily        cholecalciferol 1000 UNIT tablet    vitamin D3     Take 1,000 Units by mouth daily        fish oil omega-3 fatty acid 320MG/ML oral suspension      Take 1,000 mg by mouth daily        * ibuprofen 600 MG tablet    ADVIL/MOTRIN    30 tablet    Take 1 tablet (600 mg) by mouth every 6 hours as needed for pain (mild)    S/P laparoscopic hysterectomy       * ibuprofen 600 MG tablet    ADVIL/MOTRIN    60 tablet    Take 1 tablet (600 mg) by mouth every 6 hours as needed    S/P laparoscopic hysterectomy       losartan-hydrochlorothiazide 100-25 MG per tablet    HYZAAR     Take 1 tablet by mouth        megestrol 40 MG tablet    MEGACE     Take 40 mg by mouth        ondansetron 4 MG ODT tab    ZOFRAN-ODT    4 tablet    Take 1-2 tablets (4-8 mg) by mouth every 8 hours as needed for nausea Dissolve ON the tongue.    S/P laparoscopic hysterectomy       oxyCODONE IR 5 MG tablet    ROXICODONE    12 tablet    Take 1 tablet (5 mg) by mouth every 4 hours as needed for moderate to severe pain    S/P laparoscopic hysterectomy       oxyCODONE-acetaminophen 5-325 MG per tablet    PERCOCET    10 tablet    Take 1-2 tablets by mouth every 4 hours as needed for pain (moderate to severe)    S/P laparoscopic hysterectomy       rosuvastatin 40 MG tablet    CRESTOR     Take 40 mg by mouth daily        * senna-docusate 8.6-50 MG per tablet    SENOKOT-S;PERICOLACE    30 tablet    Take 1-2 tablets by mouth 2 times daily Take while on oral narcotics to prevent or treat constipation.    S/P laparoscopic hysterectomy       * senna-docusate 8.6-50 MG per tablet    SENOKOT-S;PERICOLACE    60 tablet    Take 1-2 tablets by mouth 2 times daily as needed for constipation    S/P laparoscopic hysterectomy       * Notice:  This list has 4 medication(s) that are the same as other medications prescribed for you. Read the directions carefully, and ask your doctor or other care provider  to review them with you.

## 2017-12-18 NOTE — NURSING NOTE
"Oncology Rooming Note    December 18, 2017 3:35 PM   Shani Tinoco is a 77 year old female who presents for:    Chief Complaint   Patient presents with     Oncology Clinic Visit     Post Op Visit.     Initial Vitals: /89  Pulse 80  Temp 98.8  F (37.1  C) (Oral)  Resp 18  Ht 1.52 m (4' 11.84\")  Wt 79.8 kg (176 lb)  SpO2 95%  BMI 34.55 kg/m2 Estimated body mass index is 34.55 kg/(m^2) as calculated from the following:    Height as of this encounter: 1.52 m (4' 11.84\").    Weight as of this encounter: 79.8 kg (176 lb). Body surface area is 1.84 meters squared.  Moderate Pain (4) Comment: Pelvic (right side)   No LMP recorded. Patient is postmenopausal.  Allergies reviewed: Yes  Medications reviewed: Yes    Medications: Medication refills not needed today.  Pharmacy name entered into [employer]: [id number] - [place], [place] - [address] [place] [address]    Clinical concerns: None Dr Degroot was NOT notified.    7 minutes for nursing intake (face to face time)     Tory Villavicencio LPN              "

## 2017-12-18 NOTE — LETTER
2017       RE: Shani Tinoco  [address] [facility] [place] [address]     Dear Colleague,    Thank you for referring your patient, Shani Tinoco, to the [facility]. Please see a copy of my visit note below.    Consult Notes on Referred Patient    [facility]  [address] [facility] [place] [address]       RE: Shani Tinoco  : 1940  ANYA:  2017     Dear Dr. Richardson Currie [place]:    I had the pleasure of seeing your patient Shani Tinoco here at the Gynecologic Cancer Clinic at the [facility] on 2017.  As you know, she is a very pleasant 77 year old woman with a recent diagnosis of endometrioid adenocarcinoma of the uterus.  Given these findings, she was subsequently sent to the Gynecologic Cancer Clinic for a new patient consultation.     Today, she reports that she is feeling well. She notes improvement in her vaginal bleeding since starting on oral progesterone as prescribed by her PCP. She denies any other symptoms, including fevers/chills, abnormal weight gain or weight los, nausea/vomiting, chest pain, shortness of breath, dysuria, change in bowel habit, or other systemic symptoms.    Cancer History:  2014: First presentation for postmenopausal bleeding.  - Pelvic US Impression: Enlarged uterus with thickened endometrium, EMS 5.57 cm. No evidence of focal mass or the like within the endometrial cavity. Right ovary not visualized. Left without abnormality.  - EMBx notable for simple hyperplasia.   - Underwent progestin therapy x 3 months. Repeat EMBx without hyperplasia.    10/10/17: Presentation to PCP for recurrent episode of postmenopausal bleeding. Subsequent endometrial biopsy, with results of:  - Cervical biopsy: Squamous and columnar mucosa with mild chronic inflammation. No squamous intraepithelial lesion identified  - Endometrial biopsy: Endometrioid adenocarcinoma, FIGO Grade 2.    10/31/17: [facility] Pathology  Consult. CASE FROM [place], [place] ([id number], OBTAINED 10/10/17):   A. CERVIX, BIOPSY:   - Squamous and columnar mucosa with mild chronic inflammation   - No squamous intraepithelial lesion identified     B. ENDOMETRIUM, BIOPSY:   - Endometrioid adenocarcinoma, FIGO grade 2    12/1/2017: [facility]/BSO/Staing:  PATH: A. 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 LYMPH NODE, RIGHT PELVIC, EXCISION:   - Five reactive lymph nodes, negative for malignancy (0/5)     C. LYMPH NODE, LEFT PELVIC, EXCISION:   - Four reactive lymph nodes, negative for malignancy (0/4)     D. LYMPH NODE, PARA-AORTIC, EXCISION:   - Thirteen reactive lymph nodes, negative for malignancy (0/13)     COMMENT:   On gross examination there was a 6 cm mass in the endometrial cavity.   Microscopically the mass proved to be endometrioid adenocarcinoma   arising in endometrial polyp with progesterone effects. The transected   tumor measures at least 2 cm on a slide (FSA2).     The left ovary is 3.9 cm. Microscopic examination show lobular growth of   spindle cells involving the ovarian cortex and expanding into the   medulla. There is no evidence of necrosis or significant atypia. Mitotic   figures are easily identifiable. The clusters of the lutein cells are   seen.     The immunostains are performed on block A15. The tumor cells are   diffusely positive for inhibin and show focal expression of calretinin    while negative for cytokeratins (AE1/AE3, CK8/18) and chromogranin. Few   desmin positive cells are located outside of the tumor nodules. 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     The MMR enzymes are intact (see biopsy [id number]). The final diagnosis   confirms the interpretation provided intraoperatively.     Report Name: Endometrium - Hysterectomy        Status: Submitted Checklist Inst: 1      Last Updated By: Lester Mcghee, 12/08/2017 15:52:14   Part(s) Involved:   A: Cervix, uterus, bilateral fallopian tubes and ovaries     Synoptic Report:     SPECIMEN     Specimen:         - Uterine corpus     CLINICAL     Clinical History:         - Uterine bleeding     SPECIMEN     Procedure       Procedure:           - Simple hysterectomy       Additional Procedures:           - Bilateral salpingo-oophorectomy           - Peritoneal washing     Specimen Integrity:         - Intact hysterectomy specimen     Lymph Node Sampling:         - Performed       Node Location:           - Pelvic lymph nodes           - Para-aortic lymph nodes     TUMOR     Primary Tumor Site:         - Anterior endometrium         - Posterior endometrium     Histologic Type:         - Endometrioid adenocarcinoma       Comment: none     Histologic Grade:         - FIGO grade 2     Tumor Size: 2 x 1.4 cm     Tumor Extent       Myometrial Invasion:           - Present         Depth of Myometrial Invasion: 14 mm         Myometerial Thickness (mm): 21 mm         Serosal Extension:             - Not identified       Tumor Involvement of Cervix:           - Not involved       Extent of Involvement of Other Organs         Right Ovary:             - Not involved         Left Ovary:             - Not involved         Right Fallopian Tube:             - Not involved         Left Fallopian Tube:             - Not involved       Peritoneal Ascitic Fluid:           - Negative for malignancy (normal / benign)     Accessory Tumor Findings        Lymph-Vascular Invasion:           - Not identified     MARGINS     Margins:         - Uninvolved by invasive carcinoma     LYMPH NODES     Regional Lymph Nodes:         - Pelvic         - Para-aortic       Number of Pelvic Lymph Nodes Examined: 9       Number of Para-Aortic Lymph Nodes Examined: 13     Lymph Node Involvement:         - None identified     STAGE (PTNM)     Primary Tumor (pT):         - pT1b: Tumor invades greater than or equal to one-half of the   myometrium     Regional Lymph Nodes (pN)       Category (pN):           - pN0: No regional lymph node metastasis     FIGO STAGE     FIGO Stage:         - IB: Invasion equal to or more than half of the myometrium       Review of Systems:  Systemic           no weight changes; no fever; no chills; no night sweats; no appetite changes  Skin           no rashes, or lesions  Eye           no irritation; no changes in vision  Lisa-Laryngeal           no dysphagia; no hoarseness   Pulmonary    no cough; no shortness of breath  Cardiovascular    no chest pain; no palpitations  Gastrointestinal    no diarrhea; no constipation; no abdominal pain; no changes in bowel  habits; no blood in stool  Genitourinary   no urinary frequency; no urinary urgency; no dysuria; no pain; no abnormal vaginal discharge; no abnormal vaginal bleeding  Breast    no breast discharge; no breast changes; no breast pain  Musculoskeletal    no myalgias; no arthralgias; no back pain  Psychiatric           no depressed mood; no anxiety    Hematologic           no tender lymph nodes; no noticeable swellings or lumps   Endocrine    no hot flashes; no heat/cold intolerance         Neurological   no tremor; no numbness and tingling; no headaches; no difficulty  sleeping      Past Medical History:  Past Medical History:   Diagnosis Date     Basal cell carcinoma      Cataract      Cholelithiasis      Endometrial cancer (H)      Essential hypertension      Hyperlipidemia      Nonexudative senile  macular degeneration of retina      Osteopenia        Past Surgical History:  Past Surgical History:   Procedure Laterality Date     CYSTOSCOPY N/A 2017    Procedure: CYSTOSCOPY;;  Surgeon: Kt Degroot MD;  Location: [facility] OR     HC EXCISION BREAST LESION, OPEN >=1      sclerosing papilloma, excised right breast     LAPAROSCOPIC HYSTERECTOMY TOTAL, BILATERAL SALPINGO-OOPHORECTOMY, COMBINED N/A 2017    Procedure: COMBINED LAPAROSCOPIC HYSTERECTOMY TOTAL, SALPINGO-OOPHORECTOMY;  Laparoscopic Total Hysterectomy, Bilateral Salpingo-Oophorectomy, Lymph Node Dissection, Anesthesia Block;  Surgeon: Kt Degroot MD;  Location: [facility] OR     PROCTOSCOPY N/A 2017    Procedure: PROCTOSCOPY;;  Surgeon: Kt Degroot MD;  Location: [facility] OR     RELEASE CARPAL TUNNEL       Unlisted proc Lungs & Pleura      Excision of hamartoma of lung     urethral sling         Health Maintenance:  Health Maintenance Due   Topic Date Due     TETANUS IMMUNIZATION (SYSTEM ASSIGNED)  1956     LIPID SCREEN Q5 YR FEMALE (SYSTEM ASSIGNED)  1983     ADVANCE DIRECTIVE PLANNING Q5 YRS  1993     FALL RISK ASSESSMENT  2003     PNEUMOCOCCAL (1 of 2 - PCV13) 2003     INFLUENZA VACCINE (SYSTEM ASSIGNED)  2017       Pap Smear:  Can not recall her last Pap Smear, but denies any history of abnormal Pap smears.    Last Mammogram: 12/21/15              Result: normal      She has not had a history of abnormal mammograms.    Last Colonoscopy: Can not recall her last Pap Smear, but denies any history                    Last DEXA Scan: 12/21/15              Result: normal    Last Diabetes Screening; 17    Last Thyroid Screening:      Not identified    Last Cholest Screenin/27/17      Current Medications:   Megestrol - 40 mg once daily  Hyzaar- 100-25 mg tablet daily  Norvasc- 5 mg tablet daily  Crestor- 40 mg tablet daily  Low dose aspirin - 81 mg daily      Allergies:  "  Allergies   Allergen Reactions     Norco [Hydrocodone-Acetaminophen] Nausea and Vomiting         Social History:   Social History   Substance Use Topics     Smoking status: Never Smoker     Smokeless tobacco: Never Used     Alcohol use No       History   Drug Use No       Family History:   The patient's family history is notable for:  Family History   Problem Relation Age of Onset     Breast Cancer Mother      Breast Cancer Sister      Breast Cancer Maternal Aunt      Breast Cancer Maternal Aunt      Breast Cancer Maternal Aunt      Breast Cancer Daughter      Cervical Cancer Daughter      Denies family history of colon, uterine, or ovarian cancer    Physical Exam:   PS: 0  VS: /71  Pulse 80  Temp 98.8  F (37.1  C) (Oral)  Resp 18  Ht 1.52 m (4' 11.84\")  Wt 79.8 kg (176 lb)  SpO2 95%  BMI 34.55 kg/m2      General Appearance: healthy and alert, no distress     HEENT:  no thyromegaly, no palpable nodules or masses        Cardiovascular: regular rate and rhythm, no gallops, rubs or murmurs     Respiratory: lungs clear, no rales, rhonchi or wheezes, normal diaphragmatic excursion    Musculoskeletal: extremities non tender and without edema    Skin: no lesions or rashes     Neurological: normal gait, no gross defects     Psychiatric: appropriate mood and affect                               Hematological: normal cervical, supraclavicular and inguinal lymph nodes     Gastrointestinal:       abdomen soft, non-tender, non-distended, no organomegaly or masses    Genitourinary: deferred      Assessment:    Shani Tinoco is a 77 year old woman with a new diagnosis of Staged IB G2 endometrioid adenocarcinoma of the uterus without LVSI .     A total of 30 minutes was spent with the patient, 30 minutes of which were spent in counseling the patient and/or treatment planning.    Plan:     1.)    Uterine adenocarcinoma. WE have discussed the findings and options. The patient understands that she is at an elevated " risk of recurrence per  criteria (G3, deep invasion, age69) and as such we have discussed the options of observation (given a lower than expected recurrence rate in ), vag cuff RT with or without chemotherapy, and whole pelvic RT which was the standard of care in .  The patient understands the complexities of this condition and after a comprehensive discussed she is inclined to likely pelvic RT but will defer final decision to meeting with the Radiation Oncology Staff    Given that she still has an impending bronchoscopy and biopsy to attend I have recommended that we try to co-ordinate those visits if possible.  She is amenable to this, but would like to receive RT in [place] if possible     2.) Genetic risk factors were assessed and the patient does meet the qualifications for a referral.    3.) Labs and/or tests ordered include:  none     4.) Health maintenance issues addressed today include: recommendations for follow up colonoscopy and mammogram.       Kt Degroot        CC  Patient Care Team

## 2017-12-21 ENCOUNTER — TELEPHONE (OUTPATIENT)
Dept: SURGERY | Facility: CLINIC | Age: 77
End: 2017-12-21

## 2017-12-21 NOTE — TELEPHONE ENCOUNTER
Patients' daughter, Itzel, called saying they had found a pulmonary group in [place], [place] who will see Shani and possibly perform a SuperDimension bronchoscopy on her as we had planned.       She gave me the contact information and fax # (see SnapShot).   Asked that I send them records as a referral before an appointment could be made.   I faxed relevant records and referred them to our Film Room if images are needed.

## 2018-01-05 ENCOUNTER — ALLIED HEALTH/NURSE VISIT (OUTPATIENT)
Dept: RADIATION ONCOLOGY | Facility: CLINIC | Age: 78
End: 2018-01-05

## 2018-01-05 DIAGNOSIS — C54.1 ENDOMETRIAL CANCER (H): Primary | ICD-10-CM

## 2018-01-08 ENCOUNTER — OFFICE VISIT (OUTPATIENT)
Dept: RADIATION ONCOLOGY | Facility: CLINIC | Age: 78
End: 2018-01-08
Attending: RADIOLOGY
Payer: MEDICARE

## 2018-01-08 VITALS
WEIGHT: 176.7 LBS | DIASTOLIC BLOOD PRESSURE: 74 MMHG | BODY MASS INDEX: 34.69 KG/M2 | SYSTOLIC BLOOD PRESSURE: 136 MMHG | HEART RATE: 93 BPM | OXYGEN SATURATION: 92 %

## 2018-01-08 DIAGNOSIS — Z90.710 S/P LAPAROSCOPIC HYSTERECTOMY: Primary | ICD-10-CM

## 2018-01-08 PROCEDURE — G0463 HOSPITAL OUTPT CLINIC VISIT: HCPCS | Performed by: RADIOLOGY

## 2018-01-08 ASSESSMENT — ENCOUNTER SYMPTOMS
CONSTIPATION: 0
NERVOUS/ANXIOUS: 1
BACK PAIN: 0
BLURRED VISION: 0
CHILLS: 0
NECK PAIN: 0
SHORTNESS OF BREATH: 0
WEIGHT LOSS: 0
DOUBLE VISION: 0
EYE PAIN: 0
BRUISES/BLEEDS EASILY: 0
SORE THROAT: 0
INSOMNIA: 0
FEVER: 0
DYSURIA: 0
NAUSEA: 0
TINGLING: 0
COUGH: 1
DEPRESSION: 0
HEMATURIA: 0
DIARRHEA: 0
FALLS: 0
VOMITING: 0
FREQUENCY: 0
HEADACHES: 1
DIZZINESS: 0
SEIZURES: 0
BLOOD IN STOOL: 0
DIAPHORESIS: 0
HEARTBURN: 0

## 2018-01-08 NOTE — PROGRESS NOTES
HPI  INITIAL PATIENT ASSESSMENT    Diagnosis: Endometrial cancer, hysterectomy 12/1/17 Dr. Degroot    Prior radiation therapy: None    Prior chemotherapy: None    Prior hormonal therapy:No    Pain Eval:  Denies    Psychosocial  Living arrangements: with daughter  Fall Risk: independent   referral needs: Not needed    Advanced Directive: No  Implantable Cardiac Device? No    Onset of menarche: @ age 10  LMP: No LMP recorded. Patient is postmenopausal.  Onset of menopause: @ age 50-55  Abnormal vaginal bleeding/discharge: No  Are you pregnant? No  Reproductive note: 5 children      Review of Systems   Constitutional: Positive for malaise/fatigue (post surgery fatigue). Negative for chills, diaphoresis, fever and weight loss.   HENT: Negative for ear pain, hearing loss, nosebleeds and sore throat.    Eyes: Negative for blurred vision, double vision and pain.   Respiratory: Positive for cough (DRy cough with a little cold). Negative for shortness of breath.    Cardiovascular: Negative for chest pain and leg swelling.   Gastrointestinal: Negative for blood in stool, constipation, diarrhea, heartburn, nausea and vomiting.   Genitourinary: Negative for dysuria, frequency, hematuria and urgency.   Musculoskeletal: Negative for back pain, falls, joint pain and neck pain.   Skin: Negative for rash.   Neurological: Positive for headaches (Occ HA in the last 2 weeks but have relieved themself). Negative for dizziness, tingling and seizures.   Endo/Heme/Allergies: Does not bruise/bleed easily.   Psychiatric/Behavioral: Negative for depression. The patient is nervous/anxious (With dx and treatment). The patient does not have insomnia.          Nurse face-to-face time: Level 4:  15 min face to face time

## 2018-01-08 NOTE — MR AVS SNAPSHOT
After Visit Summary   2018    Shani Tinoco    MRN: [id number]           Patient Information     Date Of Birth          1940        Visit Information        Provider Department      2018 1:00 PM Morena Monaco MD Radiation Oncology Clinic        Today's Diagnoses     S/P laparoscopic hysterectomy    -  1       Follow-ups after your visit        Who to contact     Please call your clinic at [phone] to:    Ask questions about your health    Make or cancel appointments    Discuss your medicines    Learn about your test results    Speak to your doctor   If you have compliments or concerns about an experience at your clinic, or if you wish to file a complaint, please contact [facility] Patient Relations at [phone] or email us at [email or web address]         Additional Information About Your Visit        MyChart Information     [employer] is an electronic gateway that provides easy, online access to your medical records. With [employer], you can request a clinic appointment, read your test results, renew a prescription or communicate with your care team.     To sign up for [employer] visit the website at [email or web address]   You will be asked to enter the access code listed below, as well as some personal information. Please follow the directions to create your username and password.     Your access code is: [email or web address]  Expires: 2018  7:45 PM     Your access code will  in 90 days. If you need help or a new code, please contact your [facility] Clinic or call [phone] for assistance.        Care EveryWhere ID     This is your Care EveryWhere ID. This could be used by other organizations to access your [place] medical records  [id number]        Your Vitals Were     Pulse Pulse Oximetry BMI (Body Mass Index)             93 92% 34.69 kg/m2          Blood Pressure from Last 3 Encounters:   18 136/74    12/18/17 125/71   12/02/17 116/67    Weight from Last 3 Encounters:   01/08/18 80.2 kg (176 lb 11.2 oz)   12/18/17 79.8 kg (176 lb)   12/01/17 80.7 kg (177 lb 14.6 oz)              Today, you had the following     No orders found for display       Primary Care Provider Office Phone # Fax #    Daniella Rodrigues [phone]       [place] [address]        Equal Access to Services     [facility]: Hadii maurilio dove hadasho Soomaali, waaxda luqadaha, qaybta kaalmada ademarlenyadanielle, abril pugh . So [facility] [phone].    ATENCIÓN: Si habla español, tiene a brufmield disposición servicios gratuitos de asistencia lingüística. [facility] [phone].    We comply with applicable federal civil rights laws and [place] laws. We do not discriminate on the basis of race, color, national origin, age, disability, sex, sexual orientation, or gender identity.            Thank you!     Thank you for choosing [facility] CLIN[facility]  for your care. Our goal is always to provide you with excellent care. Hearing back from our patients is one way we can continue to improve our services. Please take a few minutes to complete the written survey that you may receive in the mail after your visit with us. Thank you!             Your Updated Medication List - Protect others around you: Learn how to safely use, store and throw away your medicines at www.disposemymeds.org.          This list is accurate as of: 1/8/18  9:40 PM.  Always use your most recent med list.                   Brand Name Dispense Instructions for use Diagnosis    aspirin EC 81 MG EC tablet      Take 81 mg by mouth daily        cholecalciferol 1000 UNIT tablet    vitamin D3     Take 1,000 Units by mouth daily        fish oil omega-3 fatty acid 320MG/ML oral suspension      Take 1,000 mg by mouth daily        losartan-hydrochlorothiazide 100-25 MG per tablet    HYZAAR     Take 1 tablet by mouth         rosuvastatin 40 MG tablet    CRESTOR     Take 40 mg by mouth daily

## 2018-01-08 NOTE — LETTER
1/8/2018       RE: Shani Tinoco  [address] [facility] [place] [address]     Dear Colleague,    Thank you for referring your patient, Shani Tinoco, to the RADIATION ONCOLOGY CLINIC. Please see a copy of my visit note below.      HPI  INITIAL PATIENT ASSESSMENT    Diagnosis: Endometrial cancer, hysterectomy 12/1/17 Dr. Degroot    Prior radiation therapy: None    Prior chemotherapy: None    Prior hormonal therapy:No    Pain Eval:  Denies    Psychosocial  Living arrangements: with daughter  Fall Risk: independent   referral needs: Not needed    Advanced Directive: No  Implantable Cardiac Device? No    Onset of menarche: @ age 10  LMP: No LMP recorded. Patient is postmenopausal.  Onset of menopause: @ age 50-55  Abnormal vaginal bleeding/discharge: No  Are you pregnant? No  Reproductive note: 5 children      Review of Systems   Constitutional: Positive for malaise/fatigue (post surgery fatigue). Negative for chills, diaphoresis, fever and weight loss.   HENT: Negative for ear pain, hearing loss, nosebleeds and sore throat.    Eyes: Negative for blurred vision, double vision and pain.   Respiratory: Positive for cough (DRy cough with a little cold). Negative for shortness of breath.    Cardiovascular: Negative for chest pain and leg swelling.   Gastrointestinal: Negative for blood in stool, constipation, diarrhea, heartburn, nausea and vomiting.   Genitourinary: Negative for dysuria, frequency, hematuria and urgency.   Musculoskeletal: Negative for back pain, falls, joint pain and neck pain.   Skin: Negative for rash.   Neurological: Positive for headaches (Occ HA in the last 2 weeks but have relieved themself). Negative for dizziness, tingling and seizures.   Endo/Heme/Allergies: Does not bruise/bleed easily.   Psychiatric/Behavioral: Negative for depression. The patient is nervous/anxious (With dx and treatment). The patient does not have insomnia.          Nurse face-to-face time: Level 4:  15  min face to face time        RADIATION ONCOLOGY CONSULT NOTE  Date of Visit: 2018    Shani Tinoco  MRN: [id number]  : 1940    Shani Tinoco is being seen today for initial consultation at the request of Dr. Kt Degroot for consideration of radiation therapy for FIGO Stage IB, Grade 2, pT1b pN0 Endometrial adenocarcinoma. All pertinent labs, imaging, and pathology findings have been reviewed.     HISTORY OF PRESENT ILLNESS:Ms. Tinoco is a 77 year old female with PMHx of HTN and HLD, with a recent diagnosis of early stage endometrial cancer.     In brief, the patient first presented to care in 2014 with the CC of postmenopausal bleeding. She lives in [place], [place]. Intial workup with pelvic US showed an enlarged uterus with thickened endometrial stripe (2.6cm). She underwent an endometrial biopsy, which showed only endometrial hyperplasia, and was initiated on progestin therapy. Her symptoms then resolved and a repeat endometrial biopsy several months later was reported as benign.     The patient was asymptomatic between  and , but developed recurrent vaginal bleeding and re-presented to her PCP. Cervical biopsy was WNLs but repeat endometrial biopsy revealed endometrioid cancer. She was then referred to the [facility] and then underwent TLH-BSO with pelvic and para-aortic LN dissection on 2017 by Dr. Degroot. Surgical pathology from this procedure revealed a 2 x 1.4 cm FIGO grade 2 endometrial adenocarcinoma, in the background of decidualized stroma, with 14mm invasion into a 21mm thick myometirum (~2/3). LVI was not identified and margins were clear. There was no cervical, fallopian tube or ovarian involvement. 0/5 right pelvic, 0/4 left pelvic and 0/13 para-aortic LNs were found to harbor metastases. She presents for consideration of adjuvant RT, having been presented with the options of observation, vaginal brachytherapy +/- chemotherapy and whole pelvis RT by the  GYN oncology team. The patient is stated as leaning toward adjuvant radiation treatment, but would prefer for such therapy to be delivered in [place] if possible.     Today she states that she is currently well, and that she has made a full recovery from her surgery.  She denies any vaginal spotting or discharge, and says that she has good energy level.  She denies fever, chills, dysuria, constipation, recent changes in weight or pain at any site.      CHEMOTHERAPY HISTORY: None    PAST RADIATION THERAPY HISTORY: None    PAST MEDICAL HISTORY:  Past Medical History:   Diagnosis Date     Basal cell carcinoma      Cataract      Cholelithiasis      Endometrial cancer (H) 12/2017     Essential hypertension      Hyperlipidemia      Nonexudative senile macular degeneration of retina      Osteopenia      PAST SURGICAL HISTORY: TLH-BSO, PPLND, urethral sling, hamartoma excision in the lung    ALLERGIES: Norco, which causes N/V    MEDICATIONS:  Current Outpatient Prescriptions   Medication Sig Dispense Refill     cholecalciferol (VITAMIN D3) 1000 UNIT tablet Take 1,000 Units by mouth daily        aspirin EC 81 MG EC tablet Take 81 mg by mouth daily        losartan-hydrochlorothiazide (HYZAAR) 100-25 MG per tablet Take 1 tablet by mouth       rosuvastatin (CRESTOR) 40 MG tablet Take 40 mg by mouth daily        Omega-3-acid Ethyl Esters (FISH OIL OMEGA-3 FATTY ACID) 320MG/ML oral suspension Take 1,000 mg by mouth daily         FAMILY HISTORY: Significant for multiple 1st and 2nd degree female family members with breast cancer. She also has 2 daugthers who had cervical cancer.    SOCIAL HISTORY:  Social History     Social History     Marital status:      Spouse name: N/A     Number of children: 5     Years of education: N/A     Occupational History     Not on file.     Social History Main Topics     Smoking status: Former Smoker     Smokeless tobacco: Never Used      Comment: Quit 35 yrs ago 1/8/18     Alcohol use No       Comment: rare use     Drug use: No     Sexual activity: Not on file     Other Topics Concern     Not on file     Social History Narrative     REVIEW OF SYSTEMS: A 14 point review of systems was obtained. Pertinent findings are noted in the HPI and are otherwise unremarkable.     PHYSICAL EXAM:  VITALS: /74  Pulse 93  Wt 80.2 kg (176 lb 11.2 oz)  SpO2 92%  BMI 34.69 kg/m2  GEN: Appears well, alert, oriented, and in NAD  HEENT: EOMI, normal conjunctiva, MMM, no thrush  NECK: Supple, full ROM, no cervical or clavicular lymphadenopathy  CV: RRR, no murmurs/rubs/gallops, warm and well-perfused  RESP: CTAB, no wheezes/rales/rhonchi, breathing comfortably on room air  ABDOMEN: Soft, NT, ND, bowel sounds present  PELVIC EXAM: Deferred  SKIN: Normal color and turgor  NEURO: No focal deficits, CN 3-12 grossly intact, normal and symmetric motor and sensory exam in bilateral upper and lower extremities, normal gait  PSYCH: Appropriate mood and affect    ECOG PERFORMANCE STATUS: 0    PACEMAKER: None    PREGNANCY STATUS: N/A, s/p [facility]-BSO    IMPRESSION: Ms. Tinoco is a 77 year old female with high-intermediate risk FIGO stage IB endometrial carcinoma. She presents today to receive more information about adjuvant radiation therapy for her condition.  She is a resident of [place], [place] and would prefer to receive any recommended treatment closer to home if feasible.    RECOMMENDATION: The patient's age is >70 and she has 2 additional concerning pathologic risk factors: grade 2 disease and tumor invasion to the outer 1/3 of the myometrium.  She thus fits the high intermediate risk of the GOG-99 and PORTEC studies.  These studies have found that adjuvant radiation therapy  reduces local recurrence risk when compared with post-surgical observation alone (in patients with early stage endometrial cancer).  In particular, PORTEC-2 trial showed equally favorable outcome when brachytherapy was compared with pelvic external beam  radiation.      Alternatively, adjuvant vaginal brachytherapy combined with chemotherapy as per  may be considered.  However, in the recent update of the trial, this approach was not found to be superior to pelvic external beam radiotherapy, and it was also associated with a higher risk of acute hematologic toxicities.     All in all, we discussed that given her risk factors, we do not recommend observation.  However, any of the above mentioned adjuvant options would be reasonable.  Of those, vaginal brachytherapy alone is associated with least amount of toxicity.      We encouraged Ms. Tinoco, her daughter and her granddaughter to ask questions, which we answered to the best of our ability.  In the end, the patient expressed preference for ongoing treatment with adjuvant vaginal brachytherapy alone.  We have recommended that the patient contact the [facility] Radiation Oncology clinic to request consultation with one of their providers.  The patient has signed a release of information form at today's interview, and stated that she would follow-up with our offices if her attempts to coordinate treatment in [place] are ultimately unsuccessful.    The patient was seen and discussed with staff, Dr. Monaco. Thank you for involving us in the care of this patient.  Please feel free to contact us with questions or concerns at any time.    Mook Raza MD-PhD PGY-2  Radiation Oncology Resident, [facility]  Patient Care Team:  Daniella Rodrigues MD as PCP - General (Internal Medicine)  Kt Degroot MD as MD (Oncology)  Roopa Huggins RN as Registered Nurse      I saw and examined the patient with the resident.  I have reviewed and agree with the resident's note and plan of care.      Morena Monaco MD

## 2018-01-09 NOTE — PROGRESS NOTES
RADIATION ONCOLOGY CONSULT NOTE  Date of Visit: 2018    Shani Tinoco  MRN: [id number]  : 1940    Shani Tinoco is being seen today for initial consultation at the request of Dr. Kt Degroot for consideration of radiation therapy for FIGO Stage IB, Grade 2, pT1b pN0 Endometrial adenocarcinoma. All pertinent labs, imaging, and pathology findings have been reviewed.     HISTORY OF PRESENT ILLNESS:Ms. Tinoco is a 77 year old female with PMHx of HTN and HLD, with a recent diagnosis of early stage endometrial cancer.     In brief, the patient first presented to care in 2014 with the CC of postmenopausal bleeding. She lives in [place], [place]. Intial workup with pelvic US showed an enlarged uterus with thickened endometrial stripe (2.6cm). She underwent an endometrial biopsy, which showed only endometrial hyperplasia, and was initiated on progestin therapy. Her symptoms then resolved and a repeat endometrial biopsy several months later was reported as benign.     The patient was asymptomatic between  and , but developed recurrent vaginal bleeding and re-presented to her PCP. Cervical biopsy was WNLs but repeat endometrial biopsy revealed endometrioid cancer. She was then referred to the [facility] and then underwent TLH-BSO with pelvic and para-aortic LN dissection on 2017 by Dr. Degroot. Surgical pathology from this procedure revealed a 2 x 1.4 cm FIGO grade 2 endometrial adenocarcinoma, in the background of decidualized stroma, with 14mm invasion into a 21mm thick myometirum (~2/3). LVI was not identified and margins were clear. There was no cervical, fallopian tube or ovarian involvement. 0/5 right pelvic, 0/4 left pelvic and 0/13 para-aortic LNs were found to harbor metastases. She presents for consideration of adjuvant RT, having been presented with the options of observation, vaginal brachytherapy +/- chemotherapy and whole pelvis RT by the GYN oncology team. The patient  is stated as leaning toward adjuvant radiation treatment, but would prefer for such therapy to be delivered in [place] if possible.     Today she states that she is currently well, and that she has made a full recovery from her surgery.  She denies any vaginal spotting or discharge, and says that she has good energy level.  She denies fever, chills, dysuria, constipation, recent changes in weight or pain at any site.      CHEMOTHERAPY HISTORY: None    PAST RADIATION THERAPY HISTORY: None    PAST MEDICAL HISTORY:  Past Medical History:   Diagnosis Date     Basal cell carcinoma      Cataract      Cholelithiasis      Endometrial cancer (H) 12/2017     Essential hypertension      Hyperlipidemia      Nonexudative senile macular degeneration of retina      Osteopenia      PAST SURGICAL HISTORY: TLH-BSO, PPLND, urethral sling, hamartoma excision in the lung    ALLERGIES: Norco, which causes N/V    MEDICATIONS:  Current Outpatient Prescriptions   Medication Sig Dispense Refill     cholecalciferol (VITAMIN D3) 1000 UNIT tablet Take 1,000 Units by mouth daily        aspirin EC 81 MG EC tablet Take 81 mg by mouth daily        losartan-hydrochlorothiazide (HYZAAR) 100-25 MG per tablet Take 1 tablet by mouth       rosuvastatin (CRESTOR) 40 MG tablet Take 40 mg by mouth daily        Omega-3-acid Ethyl Esters (FISH OIL OMEGA-3 FATTY ACID) 320MG/ML oral suspension Take 1,000 mg by mouth daily         FAMILY HISTORY: Significant for multiple 1st and 2nd degree female family members with breast cancer. She also has 2 daugthers who had cervical cancer.    SOCIAL HISTORY:  Social History     Social History     Marital status:      Spouse name: N/A     Number of children: 5     Years of education: N/A     Occupational History     Not on file.     Social History Main Topics     Smoking status: Former Smoker     Smokeless tobacco: Never Used      Comment: Quit 35 yrs ago 1/8/18     Alcohol use No      Comment: rare use     Drug use:  No     Sexual activity: Not on file     Other Topics Concern     Not on file     Social History Narrative     REVIEW OF SYSTEMS: A 14 point review of systems was obtained. Pertinent findings are noted in the HPI and are otherwise unremarkable.     PHYSICAL EXAM:  VITALS: /74  Pulse 93  Wt 80.2 kg (176 lb 11.2 oz)  SpO2 92%  BMI 34.69 kg/m2  GEN: Appears well, alert, oriented, and in NAD  HEENT: EOMI, normal conjunctiva, MMM, no thrush  NECK: Supple, full ROM, no cervical or clavicular lymphadenopathy  CV: RRR, no murmurs/rubs/gallops, warm and well-perfused  RESP: CTAB, no wheezes/rales/rhonchi, breathing comfortably on room air  ABDOMEN: Soft, NT, ND, bowel sounds present  PELVIC EXAM: Deferred  SKIN: Normal color and turgor  NEURO: No focal deficits, CN 3-12 grossly intact, normal and symmetric motor and sensory exam in bilateral upper and lower extremities, normal gait  PSYCH: Appropriate mood and affect    ECOG PERFORMANCE STATUS: 0    PACEMAKER: None    PREGNANCY STATUS: N/A, s/p [facility]-BSO    IMPRESSION: Ms. Tinoco is a 77 year old female with high-intermediate risk FIGO stage IB endometrial carcinoma. She presents today to receive more information about adjuvant radiation therapy for her condition.  She is a resident of [place], [place] and would prefer to receive any recommended treatment closer to home if feasible.    RECOMMENDATION: The patient's age is >70 and she has 2 additional concerning pathologic risk factors: grade 2 disease and tumor invasion to the outer 1/3 of the myometrium.  She thus fits the high intermediate risk of the GOG-99 and PORTEC studies.  These studies have found that adjuvant radiation therapy  reduces local recurrence risk when compared with post-surgical observation alone (in patients with early stage endometrial cancer).  In particular, PORTEC-2 trial showed equally favorable outcome when brachytherapy was compared with pelvic external beam radiation.      Alternatively,  adjuvant vaginal brachytherapy combined with chemotherapy as per  may be considered.  However, in the recent update of the trial, this approach was not found to be superior to pelvic external beam radiotherapy, and it was also associated with a higher risk of acute hematologic toxicities.     All in all, we discussed that given her risk factors, we do not recommend observation.  However, any of the above mentioned adjuvant options would be reasonable.  Of those, vaginal brachytherapy alone is associated with least amount of toxicity.      We encouraged Ms. Tinoco, her daughter and her granddaughter to ask questions, which we answered to the best of our ability.  In the end, the patient expressed preference for ongoing treatment with adjuvant vaginal brachytherapy alone.  We have recommended that the patient contact the [facility] Radiation Oncology clinic to request consultation with one of their providers.  The patient has signed a release of information form at today's interview, and stated that she would follow-up with our offices if her attempts to coordinate treatment in [place] are ultimately unsuccessful.    The patient was seen and discussed with staff, Dr. Monaco. Thank you for involving us in the care of this patient.  Please feel free to contact us with questions or concerns at any time.    Mook Raza MD-PhD PGY-2  Radiation Oncology Resident, [facility]  Patient Care Team:  Daniella Rodrigues MD as PCP - General (Internal Medicine)  Tony Degroot MD as MD (Oncology)  Roopa Huggins, RN as Registered Nurse  TONY DEGROOT    I saw and examined the patient with the resident.  I have reviewed and agree with the resident's note and plan of care.      Morena Monaco MD

## 2018-03-02 NOTE — PROGRESS NOTES
Surgery  12/1/17  Post op visit  12/18/17    Spoke with pt   Denies need for additional pain meds using ibuprofen which is working well  Noting abdominal pain on left side  Feeling bloated, denies nausea  Denies urinary issues, fever, leg redness/tenderness/swelling  Eating and drinking ok  No real BM since surgery, had small runny BM day after surgery but none since  Taking stool softeners  Encouraged fluid intake walking  Call if no bm, pain increases or nausea  Pt to keep post op appt

## 2022-06-04 NOTE — LETTER
2017       RE: Shani Campa Earnest  [address] [facility] [place] [address]     Dear Colleague,    Thank you for referring your patient, Shani Tinoco, to the [facility]. Please see a copy of my visit note below.    Consult Notes on Referred Patient    Date: 2017       Dr. Richardson [facility]  [address] [place] [address]       RE: Shani Campa Earnest  : 1940  ANYA: 2017    Dear Dr. Richardson [facility]:    I had the pleasure of seeing your patient Shani Tinoco here at the Gynecologic Cancer Clinic at the [facility] on 2017.  As you know, she is a very pleasant 77 year old woman with a recent diagnosis of endometrioid adenocarcinoma of the uterus.  Given these findings, she was subsequently sent to the Gynecologic Cancer Clinic for a new patient consultation.     Today, she reports that she is feeling well. She notes improvement in her vaginal bleeding since starting on oral progesterone as prescribed by her PCP. She denies any other symptoms, including fevers/chills, abnormal weight gain or weight los, nausea/vomiting, chest pain, shortness of breath, dysuria, change in bowel habit, or other systemic symptoms.    Cancer History:  2014: First presentation for postmenopausal bleeding.  - Pelvic US Impression: Enlarged uterus with thickened endometrium, EMS 5.57 cm. No evidence of focal mass or the like within the endometrial cavity. Right ovary not visualized. Left without abnormality.  - EMBx notable for simple hyperplasia.   - Underwent progestin therapy x 3 months. Repeat EMBx without hyperplasia.    10/10/17: Presentation to PCP for recurrent episode of postmenopausal bleeding. Subsequent endometrial biopsy, with results of:  - Cervical biopsy: Squamous and columnar mucosa with mild chronic inflammation. No squamous intraepithelial lesion identified  - Endometrial biopsy: Endometrioid adenocarcinoma, FIGO Grade  2.    10/31/17: [facility] Pathology Consult. CASE FROM [place], [place] ([id number], OBTAINED 10/10/17):   A. CERVIX, BIOPSY:   - Squamous and columnar mucosa with mild chronic inflammation   - No squamous intraepithelial lesion identified     B. ENDOMETRIUM, BIOPSY:   - Endometrioid adenocarcinoma, FIGO grade 2      Review of Systems:  Systemic           no weight changes; no fever; no chills; no night sweats; no appetite changes  Skin           no rashes, or lesions  Eye           no irritation; no changes in vision  Lisa-Laryngeal           no dysphagia; no hoarseness   Pulmonary    no cough; no shortness of breath  Cardiovascular    no chest pain; no palpitations  Gastrointestinal    no diarrhea; no constipation; no abdominal pain; no changes in bowel  habits; no blood in stool  Genitourinary   no urinary frequency; no urinary urgency; no dysuria; no pain; no abnormal vaginal discharge; no abnormal vaginal bleeding  Breast    no breast discharge; no breast changes; no breast pain  Musculoskeletal    no myalgias; no arthralgias; no back pain  Psychiatric           no depressed mood; no anxiety    Hematologic           no tender lymph nodes; no noticeable swellings or lumps   Endocrine    no hot flashes; no heat/cold intolerance         Neurological   no tremor; no numbness and tingling; no headaches; no difficulty  sleeping      Past Medical History:  Past Medical History:   Diagnosis Date     Basal cell carcinoma      Cataract      Cholelithiasis      Essential hypertension      Hyperlipidemia      Nonexudative senile macular degeneration of retina      Osteopenia        Past Surgical History:  Past Surgical History:   Procedure Laterality Date     HC EXCISION BREAST LESION, OPEN >=1      sclerosing papilloma, excised right breast     RELEASE CARPAL TUNNEL       Unlisted proc Lungs & Pleura      Excision of hamartoma of lung     urethral sling         Health Maintenance:  Health Maintenance Due   Topic Date  Due     TETANUS IMMUNIZATION (SYSTEM ASSIGNED)  1956     LIPID SCREEN Q5 YR FEMALE (SYSTEM ASSIGNED)  1983     ADVANCE DIRECTIVE PLANNING Q5 YRS  1993     FALL RISK ASSESSMENT  2003     PNEUMOCOCCAL (1 of 2 - PCV13) 2003     INFLUENZA VACCINE (SYSTEM ASSIGNED)  2017       Pap Smear:  Can not recall her last Pap Smear, but denies any history of abnormal Pap smears.    Last Mammogram: 12/21/15              Result: normal      She has not had a history of abnormal mammograms.    Last Colonoscopy: Can not recall her last Pap Smear, but denies any history                    Last DEXA Scan: 12/21/15              Result: normal    Last Diabetes Screening; 15    Last Thyroid Screening:      Not identified    Last Cholest Screenin/27/17      Current Medications:   Megestrol - 40 mg once daily  Hyzaar- 100-25 mg tablet daily  Norvasc- 5 mg tablet daily  Crestor- 40 mg tablet daily  Low dose aspirin - 81 mg daily      Allergies:   Allergies   Allergen Reactions     Norco [Hydrocodone-Acetaminophen] Nausea and Vomiting         Social History:   Social History   Substance Use Topics     Smoking status: Not on file     Smokeless tobacco: Not on file     Alcohol use Not on file       History   Drug Use Not on file       Family History:   The patient's family history is notable for:  Family History   Problem Relation Age of Onset     Breast Cancer Mother      Breast Cancer Sister      Breast Cancer Maternal Aunt      Breast Cancer Maternal Aunt      Breast Cancer Maternal Aunt      Breast Cancer Daughter      Cervical Cancer Daughter      Denies family history of colon, uterine, or ovarian cancer    Physical Exam:   There were no vitals taken for this visit.  There is no height or weight on file to calculate BMI.    General Appearance: healthy and alert, no distress     HEENT:  no thyromegaly, no palpable nodules or masses        Cardiovascular: regular rate and rhythm, no gallops,  rubs or murmurs     Respiratory: lungs clear, no rales, rhonchi or wheezes, normal diaphragmatic excursion    Musculoskeletal: extremities non tender and without edema    Skin: no lesions or rashes     Neurological: normal gait, no gross defects     Psychiatric: appropriate mood and affect                               Hematological: normal cervical, supraclavicular and inguinal lymph nodes     Gastrointestinal:       abdomen soft, non-tender, non-distended, no organomegaly or masses    Genitourinary: External genitalia and urethral meatus appears normal.  Vagina is smooth without nodularity or masses.  Cervix is multiparous with presence of what appears to be an endocervical polyp at the 9 o'clock position.  Bimanual exam reveal no masses, nodularity or fullness.  Recto-vaginal exam confirms these findings.    Procedure:  Cervix was cleansed with betadine solution after verification that she did not have an allergy. Biopsy was taken with Tischler forceps and placed in specimen cup and sent to pathology. Silver nitrate used to chemically cauterize the area. Hemostasis noted at the end of procedure.      Assessment:    Shani Tinoco is a 77 year old woman with a new diagnosis of endometrioid adenocarcinoma of the uterus.     A total of 30 minutes was spent with the patient, 30 minutes of which were spent in counseling the patient and/or treatment planning.    Plan:     1.)    Uterine adenocarcinoma. Discussed pathology obtained on recent endometrial biopsies. Recommendations to proceed with surgical intervention. Discussed TLH, BSO, with possible need for XL, sentinel lymph node biopsy, PPALND, and cancer staging. Risks, including bleeding, infection, and injury to surrounding organs were discussed with patient. She is amenable to proceed. Also discussed that final pathology would dictate ultimate treatment plan. Discussed both ends of treatment spectrum, including interval screening to chemotherapy vs.  radiation.     2.) Genetic risk factors were assessed and the patient does meet the qualifications for a referral.    3.) Labs and/or tests ordered include:  EKG, CXR, CBC, and CMP.     4.) Health maintenance issues addressed today include: recommendations for follow up colonoscopy and mammogram.    5.) Pre-op teaching was completed today.  Risks of surgery were discussed to include: bleeding, transfusion, infection, unintentional injury to surrounding organs/structures.      Thank you for allowing us to participate in the care of your patient.         Sincerely,    Kt Degroot    I have examined this patient with our resident who acted as as scribe and agree with the above findings and plan.    Kt Degroot          Addendum:  This patient was evaluated by CXR and noted to have a significant incidental lesion.  Will plan for pulmonary nodule clinic appointment - I attempted to call patient without success (no answering machine at only number.    CC  Patient Care Team    Again, thank you for allowing me to participate in the care of your patient.      Sincerely,    Kt Degroot MD       Xolair Pregnancy And Lactation Text: This medication is Pregnancy Category B and is considered safe during pregnancy. This medication is excreted in breast milk.

## (undated) DEVICE — TUBING IRRIG CYSTO/BLADDER SET 81" LF 2C4040

## (undated) DEVICE — DEVICE ENDO STITCH APPLIER 10MM 173016

## (undated) DEVICE — ENDO TROCAR 05MM VERSAONE BLADELESS W/STD FIX CAN NONB5STF

## (undated) DEVICE — DRAPE SHEET MED 44X70" 9355

## (undated) DEVICE — SUCTION IRR STRYKERFLOW II W/TIP 250-070-520

## (undated) DEVICE — SU VICRYL 2-0 CT-2 27" J333H

## (undated) DEVICE — WIPES FOLEY CARE SURESTEP PROVON DFC100

## (undated) DEVICE — DRSG TEGADERM 2 3/8X2 3/4" 1624W

## (undated) DEVICE — SOL ADH LIQUID BENZOIN SWAB 0.6ML C1544

## (undated) DEVICE — UTERINE MANIPULATOR RUMI 6.7MMX8CM UMB678

## (undated) DEVICE — SU VICRYL 0 TIE 54" J608H

## (undated) DEVICE — CLIP APPLIER ENDO 10MM M/L 176657

## (undated) DEVICE — SUCTION MANIFOLD DORNOCH ULTRA CART UL-CL500

## (undated) DEVICE — KOH COLPOTOMIZER OCCLUDER  CPO-6

## (undated) DEVICE — NDL INSUFFLATION 120MM VERRES 172015

## (undated) DEVICE — SU VICRYL 0 UR-6 27" J603H

## (undated) DEVICE — KIT PATIENT POSITIONING PIGAZZI LATEX FREE 40580

## (undated) DEVICE — DRSG GAUZE 2X2" 8042

## (undated) DEVICE — LINEN TOWEL PACK X30 5481

## (undated) DEVICE — PREP TECHNI-CARE CHLOROXYLENOL 3% 4OZ BOTTLE C222-4ZWO

## (undated) DEVICE — PREP CHLORAPREP 26ML TINTED ORANGE  260815

## (undated) DEVICE — KIT PROCEDURE PINPOINT PP9036

## (undated) DEVICE — SOL WATER IRRIG 1000ML BOTTLE 2F7114

## (undated) DEVICE — Device

## (undated) DEVICE — SU ENDO STITCH POLYSORB 2-0 ES-9 48"  170053

## (undated) DEVICE — ENDO TROCAR 12MM VERSAONE BLADELESS W/STD FIX CAN NONB12STF

## (undated) DEVICE — ESU GROUND PAD ADULT W/CORD E7507

## (undated) DEVICE — SYR 10ML FINGER CONTROL W/O NDL 309695

## (undated) DEVICE — JELLY LUBRICATING SURGILUBE 2OZ TUBE

## (undated) DEVICE — SOL NACL 0.9% INJ 1000ML BAG 07983-09

## (undated) DEVICE — ENDO SHEARS 5MM 176643

## (undated) DEVICE — SPECIMEN TRAP MUCOUS 40ML LUKI C30200A

## (undated) DEVICE — DEVICE SUTURE GRASPER TROCAR CLOSURE 14GA PMITCSG

## (undated) DEVICE — GLOVE PROTEXIS POWDER FREE SMT 7.5  2D72PT75X

## (undated) DEVICE — ESU LIGASURE LAPAROSCOPIC BLUNT TIP SEALER 5MMX37CM LF1837

## (undated) DEVICE — ENDO TROCAR OPTICAL 12MM VERSAONE W/STD FIX CAN UNVCA12STF

## (undated) DEVICE — LINEN TOWEL PACK X6 WHITE 5487

## (undated) DEVICE — SU MONOCRYL 4-0 PS-2 27" UND Y426H

## (undated) RX ORDER — PHENAZOPYRIDINE HYDROCHLORIDE 200 MG/1
TABLET, FILM COATED ORAL
Status: DISPENSED
Start: 2017-12-01

## (undated) RX ORDER — GLYCOPYRROLATE 0.2 MG/ML
INJECTION, SOLUTION INTRAMUSCULAR; INTRAVENOUS
Status: DISPENSED
Start: 2017-12-01

## (undated) RX ORDER — PROPOFOL 10 MG/ML
INJECTION, EMULSION INTRAVENOUS
Status: DISPENSED
Start: 2017-12-01

## (undated) RX ORDER — ROCURONIUM BROMIDE 50 MG/5 ML
SYRINGE (ML) INTRAVENOUS
Status: DISPENSED
Start: 2017-12-01

## (undated) RX ORDER — FENTANYL CITRATE 50 UG/ML
INJECTION, SOLUTION INTRAMUSCULAR; INTRAVENOUS
Status: DISPENSED
Start: 2017-12-01

## (undated) RX ORDER — ONDANSETRON 2 MG/ML
INJECTION INTRAMUSCULAR; INTRAVENOUS
Status: DISPENSED
Start: 2017-12-01

## (undated) RX ORDER — HYDROMORPHONE HYDROCHLORIDE 1 MG/ML
INJECTION, SOLUTION INTRAMUSCULAR; INTRAVENOUS; SUBCUTANEOUS
Status: DISPENSED
Start: 2017-12-01

## (undated) RX ORDER — CEFAZOLIN SODIUM 1 G/3ML
INJECTION, POWDER, FOR SOLUTION INTRAMUSCULAR; INTRAVENOUS
Status: DISPENSED
Start: 2017-12-01